# Patient Record
Sex: MALE | Race: BLACK OR AFRICAN AMERICAN | Employment: OTHER | URBAN - METROPOLITAN AREA
[De-identification: names, ages, dates, MRNs, and addresses within clinical notes are randomized per-mention and may not be internally consistent; named-entity substitution may affect disease eponyms.]

---

## 2018-01-21 ENCOUNTER — HOSPITAL ENCOUNTER (OUTPATIENT)
Dept: OTHER | Age: 58
Discharge: HOME | End: 2018-01-21
Attending: INTERNAL MEDICINE | Admitting: INTERNAL MEDICINE

## 2018-01-21 DIAGNOSIS — R52 PAIN: ICD-10-CM

## 2018-11-01 ASSESSMENT — PAIN SCALES - GENERAL: PAINLEVEL_OUTOF10: 10

## 2018-11-01 ASSESSMENT — PAIN DESCRIPTION - LOCATION: LOCATION: BACK;NECK

## 2018-11-01 ASSESSMENT — PAIN DESCRIPTION - PAIN TYPE: TYPE: CHRONIC PAIN

## 2018-11-02 ENCOUNTER — HOSPITAL ENCOUNTER (EMERGENCY)
Age: 58
Discharge: HOME | End: 2018-11-02
Attending: EMERGENCY MEDICINE
Payer: COMMERCIAL

## 2018-11-02 VITALS
RESPIRATION RATE: 18 BRPM | WEIGHT: 289.9 LBS | HEART RATE: 74 BPM | OXYGEN SATURATION: 99 % | BODY MASS INDEX: 39.27 KG/M2 | SYSTOLIC BLOOD PRESSURE: 148 MMHG | DIASTOLIC BLOOD PRESSURE: 84 MMHG | HEIGHT: 72 IN | TEMPERATURE: 97.1 F

## 2018-11-02 DIAGNOSIS — M62.838 SPASM OF MUSCLE: Primary | ICD-10-CM

## 2018-11-02 DIAGNOSIS — S39.012A BACK STRAIN, INITIAL ENCOUNTER: ICD-10-CM

## 2018-11-02 PROCEDURE — 99283 EMERGENCY DEPT VISIT LOW MDM: CPT

## 2018-11-02 PROCEDURE — 6370000000 HC RX 637 (ALT 250 FOR IP): Performed by: EMERGENCY MEDICINE

## 2018-11-02 PROCEDURE — 6360000002 HC RX W HCPCS: Performed by: EMERGENCY MEDICINE

## 2018-11-02 PROCEDURE — 96372 THER/PROPH/DIAG INJ SC/IM: CPT

## 2018-11-02 RX ORDER — METHYLPREDNISOLONE 4 MG/1
TABLET ORAL
Qty: 1 KIT | Refills: 0 | Status: SHIPPED | OUTPATIENT
Start: 2018-11-02 | End: 2019-01-15

## 2018-11-02 RX ORDER — OXYCODONE HYDROCHLORIDE AND ACETAMINOPHEN 5; 325 MG/1; MG/1
1 TABLET ORAL EVERY 6 HOURS PRN
Qty: 6 TABLET | Refills: 0 | Status: SHIPPED | OUTPATIENT
Start: 2018-11-02 | End: 2018-11-09

## 2018-11-02 RX ORDER — KETOROLAC TROMETHAMINE 30 MG/ML
30 INJECTION, SOLUTION INTRAMUSCULAR; INTRAVENOUS ONCE
Status: COMPLETED | OUTPATIENT
Start: 2018-11-02 | End: 2018-11-02

## 2018-11-02 RX ORDER — CYCLOBENZAPRINE HCL 10 MG
10 TABLET ORAL 3 TIMES DAILY PRN
Qty: 12 TABLET | Refills: 0 | Status: SHIPPED | OUTPATIENT
Start: 2018-11-02 | End: 2018-11-12

## 2018-11-02 RX ORDER — ORPHENADRINE CITRATE 30 MG/ML
60 INJECTION INTRAMUSCULAR; INTRAVENOUS ONCE
Status: COMPLETED | OUTPATIENT
Start: 2018-11-02 | End: 2018-11-02

## 2018-11-02 RX ORDER — OXYCODONE HYDROCHLORIDE AND ACETAMINOPHEN 5; 325 MG/1; MG/1
1 TABLET ORAL ONCE
Status: COMPLETED | OUTPATIENT
Start: 2018-11-02 | End: 2018-11-02

## 2018-11-02 RX ADMIN — ORPHENADRINE CITRATE 60 MG: 30 INJECTION INTRAMUSCULAR; INTRAVENOUS at 01:53

## 2018-11-02 RX ADMIN — OXYCODONE HYDROCHLORIDE AND ACETAMINOPHEN 1 TABLET: 5; 325 TABLET ORAL at 03:10

## 2018-11-02 RX ADMIN — KETOROLAC TROMETHAMINE 30 MG: 30 INJECTION, SOLUTION INTRAMUSCULAR at 01:54

## 2018-11-02 ASSESSMENT — PAIN - FUNCTIONAL ASSESSMENT: PAIN_FUNCTIONAL_ASSESSMENT: 0-10

## 2018-11-02 ASSESSMENT — PAIN DESCRIPTION - LOCATION: LOCATION: BACK

## 2018-11-02 ASSESSMENT — PAIN DESCRIPTION - PAIN TYPE: TYPE: ACUTE PAIN;CHRONIC PAIN

## 2018-11-02 ASSESSMENT — PAIN SCALES - GENERAL
PAINLEVEL_OUTOF10: 8

## 2019-01-18 ENCOUNTER — ANESTHESIA EVENT (OUTPATIENT)
Dept: MRI IMAGING | Age: 59
End: 2019-01-18

## 2019-01-18 ENCOUNTER — HOSPITAL ENCOUNTER (OUTPATIENT)
Dept: MRI IMAGING | Age: 59
Discharge: HOME | End: 2019-01-18
Payer: COMMERCIAL

## 2019-01-18 ENCOUNTER — ANESTHESIA (OUTPATIENT)
Dept: MRI IMAGING | Age: 59
End: 2019-01-18

## 2019-01-18 VITALS
WEIGHT: 287 LBS | HEART RATE: 62 BPM | SYSTOLIC BLOOD PRESSURE: 145 MMHG | OXYGEN SATURATION: 94 % | BODY MASS INDEX: 38.87 KG/M2 | RESPIRATION RATE: 16 BRPM | TEMPERATURE: 98 F | DIASTOLIC BLOOD PRESSURE: 86 MMHG | HEIGHT: 72 IN

## 2019-01-18 DIAGNOSIS — M25.512 LEFT SHOULDER PAIN, UNSPECIFIED CHRONICITY: ICD-10-CM

## 2019-01-18 DIAGNOSIS — M54.2 CERVICALGIA: ICD-10-CM

## 2019-01-18 LAB
GLUCOSE BLD-MCNC: 157 MG/DL (ref 70–99)
PERFORMED ON: ABNORMAL

## 2019-01-18 PROCEDURE — 7100000000 HC PACU RECOVERY - FIRST 15 MIN

## 2019-01-18 PROCEDURE — 2500000003 HC RX 250 WO HCPCS

## 2019-01-18 PROCEDURE — 7100000011 HC PHASE II RECOVERY - ADDTL 15 MIN

## 2019-01-18 PROCEDURE — 7100000001 HC PACU RECOVERY - ADDTL 15 MIN

## 2019-01-18 PROCEDURE — 6370000000 HC RX 637 (ALT 250 FOR IP): Performed by: ANESTHESIOLOGY

## 2019-01-18 PROCEDURE — 3700000001 HC ADD 15 MINUTES (ANESTHESIA)

## 2019-01-18 PROCEDURE — 6360000002 HC RX W HCPCS

## 2019-01-18 PROCEDURE — 7100000010 HC PHASE II RECOVERY - FIRST 15 MIN

## 2019-01-18 PROCEDURE — 72141 MRI NECK SPINE W/O DYE: CPT

## 2019-01-18 PROCEDURE — 3700000000 HC ANESTHESIA ATTENDED CARE

## 2019-01-18 PROCEDURE — 73221 MRI JOINT UPR EXTREM W/O DYE: CPT

## 2019-01-18 RX ORDER — FENTANYL CITRATE 50 UG/ML
25 INJECTION, SOLUTION INTRAMUSCULAR; INTRAVENOUS EVERY 5 MIN PRN
Status: DISCONTINUED | OUTPATIENT
Start: 2019-01-18 | End: 2019-01-19 | Stop reason: HOSPADM

## 2019-01-18 RX ORDER — ONDANSETRON 2 MG/ML
4 INJECTION INTRAMUSCULAR; INTRAVENOUS
Status: ACTIVE | OUTPATIENT
Start: 2019-01-18 | End: 2019-01-18

## 2019-01-18 RX ORDER — HYDRALAZINE HYDROCHLORIDE 20 MG/ML
5 INJECTION INTRAMUSCULAR; INTRAVENOUS EVERY 10 MIN PRN
Status: DISCONTINUED | OUTPATIENT
Start: 2019-01-18 | End: 2019-01-19 | Stop reason: HOSPADM

## 2019-01-18 RX ORDER — SULFAMETHOXAZOLE AND TRIMETHOPRIM 800; 160 MG/1; MG/1
1 TABLET ORAL 2 TIMES DAILY
COMMUNITY
End: 2021-07-13 | Stop reason: ALTCHOICE

## 2019-01-18 RX ORDER — FENTANYL CITRATE 50 UG/ML
50 INJECTION, SOLUTION INTRAMUSCULAR; INTRAVENOUS EVERY 5 MIN PRN
Status: DISCONTINUED | OUTPATIENT
Start: 2019-01-18 | End: 2019-01-19 | Stop reason: HOSPADM

## 2019-01-18 RX ORDER — LABETALOL HYDROCHLORIDE 5 MG/ML
5 INJECTION, SOLUTION INTRAVENOUS EVERY 10 MIN PRN
Status: DISCONTINUED | OUTPATIENT
Start: 2019-01-18 | End: 2019-01-19 | Stop reason: HOSPADM

## 2019-01-18 RX ORDER — GLIPIZIDE 5 MG/1
5 TABLET ORAL DAILY
COMMUNITY

## 2019-01-18 RX ORDER — PROMETHAZINE HYDROCHLORIDE 25 MG/ML
6.25 INJECTION, SOLUTION INTRAMUSCULAR; INTRAVENOUS
Status: ACTIVE | OUTPATIENT
Start: 2019-01-18 | End: 2019-01-18

## 2019-01-18 RX ORDER — ATORVASTATIN CALCIUM 10 MG/1
10 TABLET, FILM COATED ORAL DAILY
COMMUNITY

## 2019-01-18 RX ORDER — OXYCODONE HYDROCHLORIDE AND ACETAMINOPHEN 5; 325 MG/1; MG/1
1 TABLET ORAL EVERY 4 HOURS PRN
Status: DISCONTINUED | OUTPATIENT
Start: 2019-01-18 | End: 2019-01-19 | Stop reason: HOSPADM

## 2019-01-18 RX ORDER — OXYCODONE HYDROCHLORIDE AND ACETAMINOPHEN 5; 325 MG/1; MG/1
1 TABLET ORAL
Status: DISPENSED | OUTPATIENT
Start: 2019-01-18 | End: 2019-01-18

## 2019-01-18 RX ADMIN — OXYCODONE HYDROCHLORIDE AND ACETAMINOPHEN 1 TABLET: 5; 325 TABLET ORAL at 16:37

## 2019-01-18 ASSESSMENT — PAIN - FUNCTIONAL ASSESSMENT
PAIN_FUNCTIONAL_ASSESSMENT: 0-10
PAIN_FUNCTIONAL_ASSESSMENT: 0-10

## 2019-01-18 ASSESSMENT — PAIN DESCRIPTION - DESCRIPTORS: DESCRIPTORS: ACHING;DISCOMFORT

## 2019-01-18 ASSESSMENT — PAIN DESCRIPTION - LOCATION: LOCATION: NECK

## 2019-01-18 ASSESSMENT — PAIN SCALES - GENERAL
PAINLEVEL_OUTOF10: 6
PAINLEVEL_OUTOF10: 0

## 2019-01-18 ASSESSMENT — PAIN DESCRIPTION - PAIN TYPE: TYPE: CHRONIC PAIN

## 2019-01-23 ENCOUNTER — HOSPITAL ENCOUNTER (OUTPATIENT)
Dept: GENERAL RADIOLOGY | Age: 59
Discharge: HOME | End: 2019-01-23
Payer: COMMERCIAL

## 2019-01-23 ENCOUNTER — HOSPITAL ENCOUNTER (OUTPATIENT)
Age: 59
Discharge: HOME | End: 2019-01-23
Payer: COMMERCIAL

## 2019-01-23 DIAGNOSIS — M47.816 SPONDYLOSIS WITHOUT MYELOPATHY OR RADICULOPATHY, LUMBAR REGION: ICD-10-CM

## 2019-01-23 PROCEDURE — 72110 X-RAY EXAM L-2 SPINE 4/>VWS: CPT

## 2019-04-07 ENCOUNTER — HOSPITAL ENCOUNTER (EMERGENCY)
Age: 59
Discharge: HOME | End: 2019-04-08
Attending: EMERGENCY MEDICINE
Payer: COMMERCIAL

## 2019-04-07 VITALS
DIASTOLIC BLOOD PRESSURE: 73 MMHG | RESPIRATION RATE: 18 BRPM | HEART RATE: 86 BPM | WEIGHT: 288.14 LBS | OXYGEN SATURATION: 97 % | SYSTOLIC BLOOD PRESSURE: 160 MMHG | BODY MASS INDEX: 39.08 KG/M2 | TEMPERATURE: 98.6 F

## 2019-04-07 DIAGNOSIS — J06.9 VIRAL URI WITH COUGH: Primary | ICD-10-CM

## 2019-04-07 PROCEDURE — 87804 INFLUENZA ASSAY W/OPTIC: CPT

## 2019-04-07 PROCEDURE — 99283 EMERGENCY DEPT VISIT LOW MDM: CPT

## 2019-04-07 ASSESSMENT — PAIN DESCRIPTION - FREQUENCY: FREQUENCY: CONTINUOUS

## 2019-04-07 ASSESSMENT — PAIN SCALES - GENERAL: PAINLEVEL_OUTOF10: 6

## 2019-04-07 ASSESSMENT — PAIN DESCRIPTION - PROGRESSION: CLINICAL_PROGRESSION: GRADUALLY WORSENING

## 2019-04-07 ASSESSMENT — PAIN - FUNCTIONAL ASSESSMENT: PAIN_FUNCTIONAL_ASSESSMENT: ACTIVITIES ARE NOT PREVENTED

## 2019-04-07 ASSESSMENT — PAIN DESCRIPTION - ONSET: ONSET: PROGRESSIVE

## 2019-04-07 ASSESSMENT — PAIN DESCRIPTION - PAIN TYPE: TYPE: ACUTE PAIN

## 2019-04-07 ASSESSMENT — PAIN DESCRIPTION - DESCRIPTORS: DESCRIPTORS: ACHING

## 2019-04-08 LAB
RAPID INFLUENZA  B AGN: NEGATIVE
RAPID INFLUENZA A AGN: NEGATIVE

## 2019-04-08 ASSESSMENT — PAIN SCALES - GENERAL
PAINLEVEL_OUTOF10: 6
PAINLEVEL_OUTOF10: 6

## 2019-04-08 ASSESSMENT — PAIN - FUNCTIONAL ASSESSMENT
PAIN_FUNCTIONAL_ASSESSMENT: 0-10
PAIN_FUNCTIONAL_ASSESSMENT: ACTIVITIES ARE NOT PREVENTED
PAIN_FUNCTIONAL_ASSESSMENT: ACTIVITIES ARE NOT PREVENTED

## 2019-04-08 ASSESSMENT — PAIN DESCRIPTION - PROGRESSION
CLINICAL_PROGRESSION: NOT CHANGED
CLINICAL_PROGRESSION: GRADUALLY WORSENING

## 2019-04-08 ASSESSMENT — PAIN DESCRIPTION - DESCRIPTORS
DESCRIPTORS: ACHING
DESCRIPTORS: ACHING

## 2019-04-08 ASSESSMENT — PAIN DESCRIPTION - ONSET
ONSET: ON-GOING
ONSET: PROGRESSIVE

## 2019-04-08 ASSESSMENT — PAIN DESCRIPTION - FREQUENCY
FREQUENCY: CONTINUOUS
FREQUENCY: CONTINUOUS

## 2019-04-08 ASSESSMENT — PAIN DESCRIPTION - LOCATION: LOCATION: OTHER (COMMENT)

## 2019-04-08 ASSESSMENT — PAIN DESCRIPTION - ORIENTATION: ORIENTATION: MID

## 2019-04-08 ASSESSMENT — PAIN DESCRIPTION - PAIN TYPE
TYPE: ACUTE PAIN
TYPE: ACUTE PAIN

## 2019-04-08 NOTE — ED PROVIDER NOTES
eMERGENCY dEPARTMENT eNCOUnter      [facility]    Chief Complaint   Patient presents with    Cough     hoarseness, sore throat, general body aches. started tonight       HPI    Aleksandar Chua is a 60 y.o. male who presents with hoarseness, sore throat, and general body aches that started today. He has had these symptoms for less than 24 hours and is now in the emergency department at night for evaluation and treatment. He does not have anything wrong with his immune system nor has he had any travel outside the [place] or sick contacts. He's been afebrile and he has been taking over-the-counter Tylenol or Motrin. There's been no vomiting or diarrhea and no exacerbating or relieving factors.      PAST MEDICAL HISTORY    Past Medical History:   Diagnosis Date    Asthma     Chronic pain     DDD (degenerative disc disease), lumbar     Diabetes mellitus ([facility]     GERD (gastroesophageal reflux disease)     Hyperlipidemia     Hypertension     Morbid obesity with BMI of 40.0-44.9, [facility] 5/20/2015    Osteoarthritis     Vitamin D deficiency        SURGICAL HISTORY    Past Surgical History:   Procedure Laterality Date    FOOT SURGERY Left 4-    SHAKA / AKIN BUNIONECTOMY LEFT FOOT, EXCISE NEUROMA LEFT    FOOT SURGERY Left     JOINT REPLACEMENT Right     knee    KNEE ARTHROSCOPY      X 6    TOTAL KNEE ARTHROPLASTY Right 1/6/15       CURRENT MEDICATIONS    Current Outpatient Rx   Medication Sig Dispense Refill    glipiZIDE (GLUCOTROL) 5 MG tablet Take 5 mg by mouth daily      atorvastatin (LIPITOR) 10 MG tablet Take 10 mg by mouth daily      sulfamethoxazole-trimethoprim (BACTRIM DS;SEPTRA DS) 800-160 MG per tablet Take 1 tablet by mouth 2 times daily      Insulin Glargine (TOUJEO SOLOSTAR SC) Inject 66 Int'l Units into the skin nightly       ibuprofen (ADVIL;MOTRIN) 400 MG tablet Take 1 tablet by mouth every 6 hours as needed for Pain 20 tablet 0    meloxicam (MOBIC) 15 MG tablet Take 1 tablet by mouth daily 30 tablet 2    gabapentin (NEURONTIN) 800 MG tablet Take 1 tablet by mouth 2 times daily 60 tablet 2    albuterol (VENTOLIN HFA) 108 (90 BASE) MCG/ACT inhaler Inhale 2 puffs into the lungs every 6 hours as needed for Wheezing 1 Inhaler 2    hydrochlorothiazide (HYDRODIURIL) 25 MG tablet Take 1 qd 30 tablet 2    omeprazole (PRILOSEC) 20 MG capsule Take 1 capsule by mouth daily 30 capsule 2    aspirin 81 MG EC tablet Take 1 tablet by mouth daily.  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   Social Needs    Financial resource strain: None    Food insecurity:     Worry: None     Inability: None    Transportation needs:     Medical: None     Non-medical: None   Tobacco Use    Smoking status: Never Smoker    Smokeless tobacco: Never Used   Substance and Sexual Activity    Alcohol use: No    Drug use: No    Sexual activity: Not Currently     Partners: Female   Lifestyle    Physical activity:     Days per week: None     Minutes per session: None    Stress: None   Relationships    Social connections:     Talks on phone: None     Gets together: None     Attends Worship service: None     Active member of club or organization: None     Attends meetings of clubs or organizations: None     Relationship status: None    Intimate partner violence:     Fear of current or ex partner: None     Emotionally abused: None     Physically abused: None     Forced sexual activity: None   Other Topics Concern    None   Social History Narrative    None REVIEW OF SYSTEMS    Constitutional:  Achiness  Eyes:  Denies photophobia or discharge   HENT: Sore throat  Respiratory:  Denies cough or shortness of breath   Cardiovascular:  Denies chest pain, palpitations or swelling   GI:  Denies abdominal pain, nausea, vomiting, or diarrhea   Musculoskeletal:  Denies back pain   Skin:  Denies rash   Neurologic:  Denies headache, focal weakness or sensory changes   Endocrine:  Denies polyuria or polydypsia   Lymphatic:  Denies swollen glands   Psychiatric:  Denies depression, suicidal ideation or homicidal ideation   All systems negative except as marked. PHYSICAL EXAM    VITAL SIGNS: BP (!) 160/73   Pulse 86   Temp 98.6 °F (37 °C) (Oral)   Resp 18   Wt 288 lb 2.3 oz (130.7 kg)   SpO2 97%   BMI 39.08 kg/m²    Constitutional:  Well developed, Well nourished, No acute distress, Non-toxic appearance. HENT:  Normocephalic, Atraumatic, Bilateral external ears normal, Oropharynx moist, No oral exudates, Nose normal. Neck- Normal range of motion, No tenderness, Supple, No stridor. Eyes:  PERRL, EOMI, Conjunctiva normal, No discharge. Respiratory:  Normal breath sounds, No respiratory distress, No wheezing, No chest tenderness. Cardiovascular:  Normal heart rate, Normal rhythm, No murmurs, No rubs, No gallops. GI:  Bowel sounds normal, Soft, No tenderness, No masses, No pulsatile masses. Musculoskeletal:  Intact distal pulses, No edema, No tenderness, No cyanosis, No clubbing. Good range of motion in all major joints. No tenderness to palpation or major deformities noted. Back- No tenderness. Integument:  Warm, Dry, No erythema, No rash. Lymphatic:  No lymphadenopathy noted. Neurologic:  Alert & oriented x 3, Normal motor function, Normal sensory function, No focal deficits noted.    Psychiatric:  Affect normal, Judgment normal, Mood normal.     EKG        RADIOLOGY        PROCEDURES    Labs Reviewed   RAPID INFLUENZA A/B ANTIGENS    Narrative:

## 2019-12-26 ENCOUNTER — APPOINTMENT (OUTPATIENT)
Dept: CT IMAGING | Age: 59
End: 2019-12-26
Payer: COMMERCIAL

## 2019-12-26 ENCOUNTER — HOSPITAL ENCOUNTER (EMERGENCY)
Age: 59
Discharge: HOME | End: 2019-12-27
Payer: COMMERCIAL

## 2019-12-26 DIAGNOSIS — M54.50 ACUTE LEFT-SIDED LOW BACK PAIN WITHOUT SCIATICA: Primary | ICD-10-CM

## 2019-12-26 LAB
A/G RATIO: 0.9 (ref 1.1–2.2)
ALBUMIN SERPL-MCNC: 3.6 G/DL (ref 3.4–5)
ALP BLD-CCNC: 90 U/L (ref 40–129)
ALT SERPL-CCNC: 17 U/L (ref 10–40)
ANION GAP SERPL CALCULATED.3IONS-SCNC: 16 MMOL/L (ref 3–16)
AST SERPL-CCNC: 16 U/L (ref 15–37)
BASOPHILS ABSOLUTE: 0 K/UL (ref 0–0.2)
BASOPHILS RELATIVE PERCENT: 0.4 %
BILIRUB SERPL-MCNC: 0.3 MG/DL (ref 0–1)
BILIRUBIN URINE: NEGATIVE
BLOOD, URINE: NEGATIVE
BUN BLDV-MCNC: 13 MG/DL (ref 7–20)
CALCIUM SERPL-MCNC: 9.3 MG/DL (ref 8.3–10.6)
CHLORIDE BLD-SCNC: 98 MMOL/L (ref 99–110)
CLARITY: CLEAR
CO2: 22 MMOL/L (ref 21–32)
COLOR: YELLOW
CREAT SERPL-MCNC: 1 MG/DL (ref 0.8–1.3)
EOSINOPHILS ABSOLUTE: 0.4 K/UL (ref 0–0.6)
EOSINOPHILS RELATIVE PERCENT: 3.7 %
GFR AFRICAN AMERICAN: >60
GFR NON-AFRICAN AMERICAN: >60
GLOBULIN: 4 G/DL
GLUCOSE BLD-MCNC: 206 MG/DL (ref 70–99)
GLUCOSE URINE: 250 MG/DL
HCT VFR BLD CALC: 46.4 % (ref 40.5–52.5)
HEMOGLOBIN: 15.2 G/DL (ref 13.5–17.5)
KETONES, URINE: NEGATIVE MG/DL
LEUKOCYTE ESTERASE, URINE: NEGATIVE
LIPASE: 7 U/L (ref 13–60)
LYMPHOCYTES ABSOLUTE: 2.5 K/UL (ref 1–5.1)
LYMPHOCYTES RELATIVE PERCENT: 22.2 %
MCH RBC QN AUTO: 26.9 PG (ref 26–34)
MCHC RBC AUTO-ENTMCNC: 32.7 G/DL (ref 31–36)
MCV RBC AUTO: 82.3 FL (ref 80–100)
MICROSCOPIC EXAMINATION: ABNORMAL
MONOCYTES ABSOLUTE: 1 K/UL (ref 0–1.3)
MONOCYTES RELATIVE PERCENT: 9.2 %
NEUTROPHILS ABSOLUTE: 7.3 K/UL (ref 1.7–7.7)
NEUTROPHILS RELATIVE PERCENT: 64.5 %
NITRITE, URINE: NEGATIVE
PDW BLD-RTO: 14.8 % (ref 12.4–15.4)
PH UA: 5 (ref 5–8)
PLATELET # BLD: 210 K/UL (ref 135–450)
PMV BLD AUTO: 9.4 FL (ref 5–10.5)
POTASSIUM REFLEX MAGNESIUM: 3.9 MMOL/L (ref 3.5–5.1)
PROTEIN UA: NEGATIVE MG/DL
RBC # BLD: 5.64 M/UL (ref 4.2–5.9)
SODIUM BLD-SCNC: 136 MMOL/L (ref 136–145)
SPECIFIC GRAVITY UA: 1.02 (ref 1–1.03)
TOTAL PROTEIN: 7.6 G/DL (ref 6.4–8.2)
URINE REFLEX TO CULTURE: ABNORMAL
URINE TYPE: ABNORMAL
UROBILINOGEN, URINE: 1 E.U./DL
WBC # BLD: 11.2 K/UL (ref 4–11)

## 2019-12-26 PROCEDURE — 81003 URINALYSIS AUTO W/O SCOPE: CPT

## 2019-12-26 PROCEDURE — 99283 EMERGENCY DEPT VISIT LOW MDM: CPT

## 2019-12-26 PROCEDURE — 83690 ASSAY OF LIPASE: CPT

## 2019-12-26 PROCEDURE — 85025 COMPLETE CBC W/AUTO DIFF WBC: CPT

## 2019-12-26 PROCEDURE — 80053 COMPREHEN METABOLIC PANEL: CPT

## 2019-12-26 RX ORDER — CYCLOBENZAPRINE HCL 10 MG
10 TABLET ORAL 3 TIMES DAILY PRN
Qty: 21 TABLET | Refills: 0 | Status: SHIPPED | OUTPATIENT
Start: 2019-12-26 | End: 2020-01-02

## 2019-12-26 RX ORDER — IBUPROFEN 800 MG/1
800 TABLET ORAL EVERY 8 HOURS PRN
Qty: 30 TABLET | Refills: 0 | Status: SHIPPED | OUTPATIENT
Start: 2019-12-26

## 2019-12-26 ASSESSMENT — PAIN DESCRIPTION - FREQUENCY: FREQUENCY: CONTINUOUS

## 2019-12-26 ASSESSMENT — PAIN SCALES - GENERAL
PAINLEVEL_OUTOF10: 10
PAINLEVEL_OUTOF10: 8

## 2019-12-26 ASSESSMENT — PAIN DESCRIPTION - PAIN TYPE
TYPE: ACUTE PAIN
TYPE: ACUTE PAIN

## 2019-12-26 ASSESSMENT — PAIN - FUNCTIONAL ASSESSMENT
PAIN_FUNCTIONAL_ASSESSMENT: PREVENTS OR INTERFERES SOME ACTIVE ACTIVITIES AND ADLS
PAIN_FUNCTIONAL_ASSESSMENT: PREVENTS OR INTERFERES SOME ACTIVE ACTIVITIES AND ADLS

## 2019-12-26 ASSESSMENT — PAIN DESCRIPTION - ORIENTATION: ORIENTATION: LEFT

## 2019-12-26 ASSESSMENT — PAIN DESCRIPTION - ONSET: ONSET: ON-GOING

## 2019-12-26 ASSESSMENT — PAIN DESCRIPTION - PROGRESSION
CLINICAL_PROGRESSION: GRADUALLY IMPROVING
CLINICAL_PROGRESSION: NOT CHANGED

## 2019-12-26 ASSESSMENT — PAIN DESCRIPTION - DESCRIPTORS: DESCRIPTORS: SHARP

## 2019-12-26 ASSESSMENT — PAIN DESCRIPTION - LOCATION: LOCATION: FLANK

## 2019-12-27 VITALS
TEMPERATURE: 97.8 F | WEIGHT: 291.01 LBS | RESPIRATION RATE: 17 BRPM | DIASTOLIC BLOOD PRESSURE: 61 MMHG | OXYGEN SATURATION: 98 % | HEIGHT: 72 IN | HEART RATE: 62 BPM | BODY MASS INDEX: 39.42 KG/M2 | SYSTOLIC BLOOD PRESSURE: 145 MMHG

## 2020-09-17 ENCOUNTER — APPOINTMENT (OUTPATIENT)
Dept: GENERAL RADIOLOGY | Age: 60
End: 2020-09-17
Payer: COMMERCIAL

## 2020-09-17 ENCOUNTER — HOSPITAL ENCOUNTER (EMERGENCY)
Age: 60
Discharge: HOME | End: 2020-09-17
Payer: COMMERCIAL

## 2020-09-17 VITALS
TEMPERATURE: 98.4 F | HEIGHT: 73 IN | RESPIRATION RATE: 16 BRPM | DIASTOLIC BLOOD PRESSURE: 61 MMHG | SYSTOLIC BLOOD PRESSURE: 158 MMHG | WEIGHT: 293.87 LBS | BODY MASS INDEX: 38.95 KG/M2 | OXYGEN SATURATION: 98 % | HEART RATE: 64 BPM

## 2020-09-17 LAB
ANION GAP SERPL CALCULATED.3IONS-SCNC: 9 MMOL/L (ref 3–16)
BASOPHILS ABSOLUTE: 0.1 K/UL (ref 0–0.2)
BASOPHILS RELATIVE PERCENT: 0.9 %
BILIRUBIN URINE: NEGATIVE
BLOOD, URINE: NEGATIVE
BUN BLDV-MCNC: 12 MG/DL (ref 7–20)
CALCIUM SERPL-MCNC: 8.8 MG/DL (ref 8.3–10.6)
CHLORIDE BLD-SCNC: 98 MMOL/L (ref 99–110)
CLARITY: CLEAR
CO2: 26 MMOL/L (ref 21–32)
COLOR: YELLOW
CREAT SERPL-MCNC: 1 MG/DL (ref 0.8–1.3)
D DIMER: 272 NG/ML DDU (ref 0–229)
EOSINOPHILS ABSOLUTE: 0.3 K/UL (ref 0–0.6)
EOSINOPHILS RELATIVE PERCENT: 2.6 %
GFR AFRICAN AMERICAN: >60
GFR NON-AFRICAN AMERICAN: >60
GLUCOSE BLD-MCNC: 302 MG/DL (ref 70–99)
GLUCOSE URINE: >=1000 MG/DL
HCT VFR BLD CALC: 45.7 % (ref 40.5–52.5)
HEMOGLOBIN: 15.3 G/DL (ref 13.5–17.5)
KETONES, URINE: NEGATIVE MG/DL
LEUKOCYTE ESTERASE, URINE: NEGATIVE
LYMPHOCYTES ABSOLUTE: 2.1 K/UL (ref 1–5.1)
LYMPHOCYTES RELATIVE PERCENT: 19.5 %
MCH RBC QN AUTO: 27.3 PG (ref 26–34)
MCHC RBC AUTO-ENTMCNC: 33.4 G/DL (ref 31–36)
MCV RBC AUTO: 81.6 FL (ref 80–100)
MICROSCOPIC EXAMINATION: ABNORMAL
MONOCYTES ABSOLUTE: 0.6 K/UL (ref 0–1.3)
MONOCYTES RELATIVE PERCENT: 5.7 %
NEUTROPHILS ABSOLUTE: 7.8 K/UL (ref 1.7–7.7)
NEUTROPHILS RELATIVE PERCENT: 71.3 %
NITRITE, URINE: NEGATIVE
PDW BLD-RTO: 14.8 % (ref 12.4–15.4)
PH UA: 5.5 (ref 5–8)
PLATELET # BLD: 153 K/UL (ref 135–450)
PLATELET SLIDE REVIEW: ADEQUATE
PMV BLD AUTO: 9.2 FL (ref 5–10.5)
POTASSIUM SERPL-SCNC: 3.8 MMOL/L (ref 3.5–5.1)
PROTEIN UA: NEGATIVE MG/DL
RBC # BLD: 5.6 M/UL (ref 4.2–5.9)
REASON FOR REJECTION: NORMAL
REJECTED TEST: NORMAL
SLIDE REVIEW: ABNORMAL
SODIUM BLD-SCNC: 133 MMOL/L (ref 136–145)
SPECIFIC GRAVITY UA: 1.02 (ref 1–1.03)
URINE REFLEX TO CULTURE: ABNORMAL
URINE TYPE: ABNORMAL
UROBILINOGEN, URINE: 1 E.U./DL
WBC # BLD: 10.9 K/UL (ref 4–11)

## 2020-09-17 PROCEDURE — 73030 X-RAY EXAM OF SHOULDER: CPT

## 2020-09-17 PROCEDURE — 6360000002 HC RX W HCPCS: Performed by: PHYSICIAN ASSISTANT

## 2020-09-17 PROCEDURE — 85379 FIBRIN DEGRADATION QUANT: CPT

## 2020-09-17 PROCEDURE — 71046 X-RAY EXAM CHEST 2 VIEWS: CPT

## 2020-09-17 PROCEDURE — 99283 EMERGENCY DEPT VISIT LOW MDM: CPT

## 2020-09-17 PROCEDURE — 81003 URINALYSIS AUTO W/O SCOPE: CPT

## 2020-09-17 PROCEDURE — 96374 THER/PROPH/DIAG INJ IV PUSH: CPT

## 2020-09-17 PROCEDURE — 93005 ELECTROCARDIOGRAM TRACING: CPT | Performed by: PHYSICIAN ASSISTANT

## 2020-09-17 PROCEDURE — 80048 BASIC METABOLIC PNL TOTAL CA: CPT

## 2020-09-17 PROCEDURE — 85025 COMPLETE CBC W/AUTO DIFF WBC: CPT

## 2020-09-17 RX ORDER — KETOROLAC TROMETHAMINE 30 MG/ML
30 INJECTION, SOLUTION INTRAMUSCULAR; INTRAVENOUS ONCE
Status: COMPLETED | OUTPATIENT
Start: 2020-09-17 | End: 2020-09-17

## 2020-09-17 RX ADMIN — KETOROLAC TROMETHAMINE 30 MG: 30 INJECTION, SOLUTION INTRAMUSCULAR at 17:21

## 2020-09-17 ASSESSMENT — PAIN - FUNCTIONAL ASSESSMENT: PAIN_FUNCTIONAL_ASSESSMENT: PREVENTS OR INTERFERES SOME ACTIVE ACTIVITIES AND ADLS

## 2020-09-17 ASSESSMENT — ENCOUNTER SYMPTOMS
SHORTNESS OF BREATH: 0
BACK PAIN: 1
VOMITING: 0
NAUSEA: 0
SORE THROAT: 0
ABDOMINAL PAIN: 0

## 2020-09-17 ASSESSMENT — PAIN DESCRIPTION - DESCRIPTORS: DESCRIPTORS: OTHER (COMMENT)

## 2020-09-17 ASSESSMENT — PAIN DESCRIPTION - ONSET: ONSET: ON-GOING

## 2020-09-17 ASSESSMENT — PAIN DESCRIPTION - LOCATION: LOCATION: BACK

## 2020-09-17 ASSESSMENT — PAIN SCALES - GENERAL
PAINLEVEL_OUTOF10: 10
PAINLEVEL_OUTOF10: 10

## 2020-09-17 ASSESSMENT — PAIN DESCRIPTION - PAIN TYPE: TYPE: ACUTE PAIN

## 2020-09-17 ASSESSMENT — PAIN DESCRIPTION - ORIENTATION: ORIENTATION: UPPER

## 2020-09-17 ASSESSMENT — PAIN DESCRIPTION - FREQUENCY: FREQUENCY: CONTINUOUS

## 2020-09-17 ASSESSMENT — PAIN DESCRIPTION - PROGRESSION: CLINICAL_PROGRESSION: NOT CHANGED

## 2020-09-17 NOTE — ED PROVIDER NOTES
EKG Interpretation    Interpreted by emergency department physician  Time read: 2953    Rhythm: Sinus  Ventricular Rate: 68  QRS Axis: 5  Ectopy: Sinus arrhythmia  Conduction: Sinus rhythm with sinus arrhythmia  ST Segments: normal  T Waves: Diffusely flattened  Q Waves: None    Other findings: None    Compared to EKG on: None to compare    Clinical Impression: Normal sinus rhythm with sinus arrhythmia and diffusely flattened T waves but appears nonacute.     [address]  09/17/20 2542

## 2020-09-17 NOTE — ED TRIAGE NOTES
Pt presents to ED for upper back pain that started when getting up today. Denies injury or trauma. Hx of DDD. AAOx4, VSS, ambulatory to treatment room with steady gait.      Pain 10/10, denies taking any medications for pain

## 2020-09-18 LAB
EKG ATRIAL RATE: 68 BPM
EKG DIAGNOSIS: NORMAL
EKG P AXIS: 48 DEGREES
EKG P-R INTERVAL: 150 MS
EKG Q-T INTERVAL: 428 MS
EKG QRS DURATION: 86 MS
EKG QTC CALCULATION (BAZETT): 455 MS
EKG R AXIS: 5 DEGREES
EKG T AXIS: 44 DEGREES
EKG VENTRICULAR RATE: 68 BPM

## 2020-09-18 PROCEDURE — 93010 ELECTROCARDIOGRAM REPORT: CPT | Performed by: INTERNAL MEDICINE

## 2020-09-18 NOTE — ED PROVIDER NOTES
[address] [facility]      Pt Name: Tara Giraldo  MRN: [id number]  [place] 1960  Date of evaluation: 9/17/2020  Provider: Zeb Prajapati PA-C    This patient was not seen and evaluated by the attending physician No att. providers found. CHIEF COMPLAINT       Chief Complaint   Patient presents with    Back Pain     upper back, denies injury, states just old age, had a hard time getting up today         HISTORYOF PRESENT ILLNESS  (Location/Symptom, Timing/Onset, Context/Setting, Quality, Duration, Modifying Factors, Severity.)   Tara Giraldo is a 63 y.o. male who presents to the emergency department with right upper back pain and right shoulder pain. The patient states that the pain is been present for several days but it has been getting worse. It worsens with movement of his right shoulder if he reaches forward or if he takes a deep breath. He was seeing his pain management specialist for the pain and was told to go to the hospital for x-rays. He says he decided to come into the emergency department to have his back \"checked out again\". He denies feeling significantly short of breath and denies any chest pain or cough but does note that the pain is pleuritic in nature. He reports no weakness or numbness of extremities, loss of control of bowel or bladder, saddle anesthesia, any other concern. Nursing Notes were reviewedand I agree. REVIEW OF SYSTEMS    (2-9 systems for level 4, 10 or more forlevel 5)     Review of Systems   Constitutional: Negative for chills and fever. HENT: Negative for sore throat. Respiratory: Negative for shortness of breath. Cardiovascular: Negative for chest pain. Gastrointestinal: Negative for abdominal pain, nausea and vomiting. Genitourinary: Negative for difficulty urinating and dysuria. Musculoskeletal: Positive for arthralgias and back pain. Skin: Negative for rash.    Neurological: capsule by mouth daily, Disp-30 capsule, R-2      aspirin 81 MG EC tablet Take 1 tablet by mouth daily. , Disp-30 tablet, R-5             ALLERGIES     Patient has no known allergies. FAMILY HISTORY           Problem Relation Age of Onset    Heart Disease Father     Diabetes Mother     Heart Disease Mother      Family Status   Relation Name Status    Father      Mother  Alive        SOCIAL HISTORY    reports that he has never smoked. He has never used smokeless tobacco. He reports that he does not drink alcohol or use drugs. PHYSICAL EXAM    (up to 7 for level 4, 8 or more for level 5)     ED Triage Vitals [20 1627]   BP Temp Temp Source Pulse Resp SpO2 Height Weight   (!) 163/65 98.4 °F (36.9 °C) Oral 65 14 98 % 6' 1\" (1.854 m) 293 lb 14 oz (133.3 kg)       Physical Exam  Vitals signs and nursing note reviewed. Constitutional:       General: He is not in acute distress. Appearance: He is well-developed. HENT:      Head: Normocephalic and atraumatic. Neck:      Musculoskeletal: Neck supple. Cardiovascular:      Rate and Rhythm: Normal rate and regular rhythm. Heart sounds: Normal heart sounds. Pulmonary:      Effort: Pulmonary effort is normal. No respiratory distress. Breath sounds: Normal breath sounds. Abdominal:      General: Abdomen is flat. Tenderness: There is no abdominal tenderness. Musculoskeletal: Normal range of motion. Comments: Reduced range of motion of the right shoulder secondary to pain. He has reproducible tenderness to the right posterior rib region. There is no rash or skin changes. No crepitance. Skin:     General: Skin is warm and dry. Neurological:      Mental Status: He is alert and oriented to person, place, and time.    Psychiatric:         Behavior: Behavior normal.            DIAGNOSTIC RESULTS     EK-lead EKG interpreted as sinus rhythm with sinus arrhythmia with a rate of 68 bpm. No ST elevation or depression on my review. Please see Dr. Garth Dee note for full interpretation. RADIOLOGY:   Non-plain film images such as CT, Ultrasound and MRI are read by the radiologist.Plain radiographic images are visualized and preliminarily interpreted by Angeles Moralez PA-C with the below findings:        Interpretation per the Radiologist below, if available at the time of this note:    XR CHEST (2 VW)   Final Result   No evidence of acute cardiopulmonary disease. XR SHOULDER RIGHT (MIN 2 VIEWS)   Final Result   No acute fracture or dislocation. Severe osteoarthritis of the right shoulder.              LABS:  Labs Reviewed   CBC WITH AUTO DIFFERENTIAL - Abnormal; Notable for the following components:       Result Value    Neutrophils Absolute 7.8 (*)     All other components within normal limits    Narrative:     Susan Lacy tel. [phone],  Rejected Test Name/Called to: charge nurse Vanessa Bond, 09/17/2020 17:47, by [facility]  Performed at:  [address] [address]   Phone [phone]   D-DIMER, QUANTITATIVE - Abnormal; Notable for the following components:    D-Dimer, Quant 272 (*)     All other components within normal limits    Narrative:     Susan Lacy tel. [phone],  Rejected Test Name/Called to: charge nurse Vanessa Bond, 09/17/2020 17:47, by [facility]  Performed at:  [address] [address]   Phone [phone]   URINE RT REFLEX TO CULTURE - Abnormal; Notable for the following components:    Glucose, Ur >=1000 (*)     All other components within normal limits    Narrative:     Performed at:  [address] [address]   Phone [phone]   BASIC METABOLIC PANEL - Abnormal; Notable for the following components:    Sodium 133 (*)     Chloride 98 (*)     Glucose 302 (*)     All other components within normal limits Narrative:     Performed at:  [facility]  [address] [facility], [place] [address]   Phone [phone] REJECTION    Narrative:     Jacquelin Ernandez tel. [phone],  Rejected Test Name/Called to: charge nurse Jamil Anna, 09/17/2020 17:47, by [facility]  Performed at:  [facility]  [address] [facility], [place] [address]   Phone [phone]       All other labs were within normal range or not returned as of this dictation. EMERGENCY DEPARTMENT COURSE and DIFFERENTIAL DIAGNOSIS/MDM:   Vitals:    Vitals:    09/17/20 1627 09/17/20 1838   BP: (!) 163/65 (!) 158/61   Pulse: 65 64   Resp: 14 16   Temp: 98.4 °F (36.9 °C) 98.4 °F (36.9 °C)   TempSrc: Oral Oral   SpO2: 98% 98%   Weight: 293 lb 14 oz (133.3 kg)    Height: 6' 1\" (1.854 m)         I have evaluated this patient. My supervising physician was available for consultation. The patient presents with subacute pain but it is pleuritic in nature. His lungs are clear. X-ray showed no evidence of pneumothorax or pneumonia. An EKG was performed which showed no changes concerning for heart strain. His d-dimer is not elevated when adjusted for age. I have very low suspicion for pulmonary embolism in this patient. X-ray of the right shoulder did show significant arthritis. He was feeling much better after Toradol. He was advised to use ibuprofen twice daily with meals to help with pain control in addition to his prescribed pain medication from his pain specialist.  I have asked him to follow-up with orthopedics for further outpatient management. Discussed results, diagnosis and plan with patient and/or family. Questions addressed. Dispositionand follow-up agreed upon. Specific discharge instructions explained. The patient and/or family and I have discussed the diagnosis and risks, and we agree with discharging home to follow-up with their primary care,specialist or referral doctor. We also discussed returning to the Emergency Department immediately if new or worsening symptoms occur. We have discussed the symptoms which are most concerning that necessitate immediatereturn. PROCEDURES:  None    FINAL IMPRESSION      1. Arthritis of right shoulder region    2.  Upper back pain on right side          DISPOSITION/PLAN   DISPOSITION Decision To Discharge 09/17/2020 06:27:43 PM      PATIENT REFERRED TO:  Sarai Krause, [address] [facility]  [phone]    Schedule an appointment as soon as possible for a visit       [address] [facility] Emergency Department  [address] [place] [address]  [phone]    As needed, If symptoms worsen      MEDICATIONS:  Discharge Medication List as of 9/17/2020  6:32 PM          (Please note that portions of this note were completed with a voice recognition program.  Efforts were made toedit the dictations but occasionally words are mis-transcribed.)    FIDEL Hernandez PA-C  09/17/20 5426

## 2021-07-10 ENCOUNTER — HOSPITAL ENCOUNTER (EMERGENCY)
Age: 61
Discharge: HOME | End: 2021-07-10
Payer: COMMERCIAL

## 2021-07-10 ENCOUNTER — APPOINTMENT (OUTPATIENT)
Dept: GENERAL RADIOLOGY | Age: 61
End: 2021-07-10
Payer: COMMERCIAL

## 2021-07-10 VITALS
OXYGEN SATURATION: 95 % | WEIGHT: 294.09 LBS | HEART RATE: 65 BPM | TEMPERATURE: 97.7 F | DIASTOLIC BLOOD PRESSURE: 71 MMHG | HEIGHT: 72 IN | RESPIRATION RATE: 14 BRPM | SYSTOLIC BLOOD PRESSURE: 137 MMHG | BODY MASS INDEX: 39.83 KG/M2

## 2021-07-10 DIAGNOSIS — M17.12 OSTEOARTHRITIS OF LEFT KNEE, UNSPECIFIED OSTEOARTHRITIS TYPE: Primary | ICD-10-CM

## 2021-07-10 DIAGNOSIS — M25.562 ACUTE PAIN OF LEFT KNEE: ICD-10-CM

## 2021-07-10 PROCEDURE — 99284 EMERGENCY DEPT VISIT MOD MDM: CPT

## 2021-07-10 PROCEDURE — 73560 X-RAY EXAM OF KNEE 1 OR 2: CPT

## 2021-07-10 ASSESSMENT — PAIN DESCRIPTION - ORIENTATION: ORIENTATION: LEFT

## 2021-07-10 ASSESSMENT — PAIN SCALES - GENERAL
PAINLEVEL_OUTOF10: 10
PAINLEVEL_OUTOF10: 10

## 2021-07-10 NOTE — ED TRIAGE NOTES
[facility] 57 [facility] brought himself to the ER for eval of left knee pain. The patient states that when he walks he stumbles with his left knee and was told he will probably need a replacement but thinks he twisted two days ago walking. Pain a 10/10. The patient is alert and oriented with an open and patent airway with a normal respiratory effort.

## 2021-07-10 NOTE — ED PROVIDER NOTES
**ADVANCED PRACTICE PROVIDER, I HAVE EVALUATED THIS PATIENT**        [address]      Pt Name: Natividad Reeder  F:[id number]  Angie 1960  Date of evaluation: 7/10/2021  Provider: Winnie Hamm PA-C      Chief Complaint:    Chief Complaint   Patient presents with    Knee Pain     pt c/o left knee pain that got worse yesterday. pt. states that he needs a knee replacement but it kept him awake last night. Nursing Notes, Past Medical Hx, Past Surgical Hx, Social Hx, Allergies, and Family Hx were all reviewed and agreed with or any disagreements were addressed in the HPI.    HPI: (Location, Duration, Timing, Severity, Quality, Assoc Sx, Context, Modifying factors)    Chief Complaint of left knee pain    This is a  57 y.o. male who presents stating that he has a new issue in the left knee. He states that a couple of days ago he was walking, took a step with the left leg, felt something pop and it felt like the knee was a good to support him. He had to grab onto the cart he was next 2 to keep from falling. He states that has happened once or twice since then and generally the left knee is giving him a lot of trouble right now. He did have the right knee replaced some years ago. Patient indicates that he took some pain medication for the left knee just before coming in and that currently the pain is not bad but it gets to a 10 out of 10 when it pops or clicks or catches or near goes out. He states that he cannot cause any pain by pushing on any area of the knee and the initial swelling from a couple of days ago has gone down but the pain is mostly with stepping down and better at rest.  Therefore he came to be seen. No increased heat or redness bruising or deformity noted by patient.     PastMedical/Surgical History:      Diagnosis Date    Asthma     Chronic pain     DDD (degenerative disc disease), lumbar     Diabetes mellitus ([facility]     GERD (gastroesophageal reflux disease)     Hyperlipidemia     Hypertension     Morbid obesity with BMI of 40.0-44.9, adult ([facility] 5/20/2015    Osteoarthritis     Vitamin D deficiency          Procedure Laterality Date    FOOT SURGERY Left 4-    SHAKA / AKIN BUNIONECTOMY LEFT FOOT, EXCISE NEUROMA LEFT    FOOT SURGERY Left     JOINT REPLACEMENT Right     knee    KNEE ARTHROSCOPY      X 6    NERVE BLOCK Left     2019    TOTAL KNEE ARTHROPLASTY Right 1/6/15       Medications:  Previous Medications    ALBUTEROL (VENTOLIN HFA) 108 (90 BASE) MCG/ACT INHALER    Inhale 2 puffs into the lungs every 6 hours as needed for Wheezing    ASPIRIN 81 MG EC TABLET    Take 1 tablet by mouth daily. ATORVASTATIN (LIPITOR) 10 MG TABLET    Take 10 mg by mouth daily    GABAPENTIN (NEURONTIN) 800 MG TABLET    Take 1 tablet by mouth 2 times daily    GLIPIZIDE (GLUCOTROL) 5 MG TABLET    Take 5 mg by mouth daily    HYDROCHLOROTHIAZIDE (HYDRODIURIL) 25 MG TABLET    Take 1 qd    IBUPROFEN (ADVIL;MOTRIN) 800 MG TABLET    Take 1 tablet by mouth every 8 hours as needed for Pain    INSULIN GLARGINE (TOUJEO SOLOSTAR SC)    Inject 66 Int'l Units into the skin nightly     OMEPRAZOLE (PRILOSEC) 20 MG CAPSULE    Take 1 capsule by mouth daily    SULFAMETHOXAZOLE-TRIMETHOPRIM (BACTRIM DS;SEPTRA DS) 800-160 MG PER TABLET    Take 1 tablet by mouth 2 times daily         Review of Systems:  (2-9 systems needed)  Review of Systems    \"Positives and Pertinent negatives as per HPI\"    Physical Exam:  Physical Exam  Vitals and nursing note reviewed. Constitutional:       Appearance: Normal appearance. He is not diaphoretic. HENT:      Head: Normocephalic and atraumatic. Right Ear: External ear normal.      Left Ear: External ear normal.      Nose: Nose normal.   Eyes:      General:         Right eye: No discharge. Left eye: No discharge.       Conjunctiva/sclera: Conjunctivae normal.   Pulmonary:      Effort: Pulmonary effort is normal. No respiratory distress. Musculoskeletal:         General: No swelling or deformity. Normal range of motion. Cervical back: Normal range of motion and neck supple. Right lower leg: No edema. Left lower leg: No edema. Comments: On exam no left knee acute laxity varus or valgus with pressure or anterior or posterior. No acute deformity. No pain on palpation or movement by the provider with the left knee. No increased heat or redness compared to the right or any bruising or edema or obvious effusion. Distal pulses 2+ bilaterally in dorsalis pedis. Capillary fill brisk less than 1 second throughout. Patient states the pain is present in that left knee sometimes more medially or sometimes laterally and especially with stepping down on it and trying to use it normally. Skin:     General: Skin is warm and dry. Capillary Refill: Capillary refill takes less than 2 seconds. Findings: No bruising, erythema, lesion or rash. Neurological:      General: No focal deficit present. Mental Status: He is alert and oriented to person, place, and time. Mental status is at baseline. Psychiatric:         Mood and Affect: Mood normal.         Behavior: Behavior normal.         Judgment: Judgment normal.         MEDICAL DECISION MAKING    Vitals:    Vitals:    07/10/21 1503 07/10/21 1504   BP:  (!) 162/78   Pulse:  73   Resp:  16   Temp:  97.3 °F (36.3 °C)   SpO2:  96%   Weight: 294 lb 1.5 oz (133.4 kg)    Height:  6' (1.829 m)       LABS:Labs Reviewed - No data to display     Remainder of labs reviewed and were negative at this time or not returned at the time of this note.     RADIOLOGY:   Non-plain film images such as CT, Ultrasound and MRI are read by the radiologist. Leola Leija PA-C have directly visualized the radiologic plain film image(s) with the below findings:      Interpretation per the Radiologist below, if available at the time of this note:    XR KNEE LEFT (1-2 VIEWS)   Preliminary Result   1. No evidence of acute osseous abnormality. 2.  Moderate joint effusion. 3.  Mild-to-moderate degenerative changes, worst at the patellofemoral   compartment. XR KNEE LEFT (1-2 VIEWS)    Result Date: 7/10/2021  1. No evidence of acute osseous abnormality. 2.  Moderate joint effusion. 3.  Mild-to-moderate degenerative changes, worst at the patellofemoral compartment. MEDICAL DECISION MAKING / ED COURSE:      PROCEDURES:   Procedures    None    Patient was given:  Medications - No data to display  This patient presents as above and evaluation and treatment is begun here. Vital signs stable. X-rays of the left knee are obtained and interpreted by radiology as above. I also reviewed these noting significant degenerative changes and possible bony osteophytes versus possible free-floating bony particles. However no obvious acute fracture or dislocation. We will write for some Voltaren cream which patient states has helped his pains in the past and otherwise have him use his home medications and immobilizer and walking aids. Patient verbalizes understanding and agreement with the above plan and the following discharge home instructions. The patient tolerated their visit well. I evaluated the patient. The physician was available for consultation as needed. The patient and / or the family were informed of the results of any tests, a time was given to answer questions, a plan was proposed and they agreed with plan. Use your knee immobilizer and crutches or walker as needed, use the medication as written and do warm or cool compresses. Follow-up outpatient with orthopedist on Monday for next available appointment. Return to the emergency department for any emergency worsening or concern. CLINICAL IMPRESSION:  1. Osteoarthritis of left knee, unspecified osteoarthritis type    2.  Acute pain of left knee        DISPOSITION Decision To Discharge 07/10/2021 05:18:02 PM      PATIENT REFERRED TO:  [facility]  [address]  Call in 2 days        DISCHARGE MEDICATIONS:  New Prescriptions    DICLOFENAC SODIUM 1 % CREA    Apply 2 g topically every 6 hours as needed (pain/swelling) To the left knee       DISCONTINUED MEDICATIONS:  Discontinued Medications    DICLOFENAC SODIUM (VOLTAREN) 1 % GEL    Apply 2 g topically 4 times daily for 30 days.               (Please note the MDM and HPI sections of this note were completed with a voice recognition program.  Efforts were made to edit the dictations but occasionally words are mis-transcribed.)    Electronically signed, Lio Chavarria PA-C,          Lio Chavarria PA-C  07/10/21 7356

## 2021-07-10 NOTE — ED NOTES
.Pt discharged at this time. Discharge instructions and medications reviewed,  Questions were answered. PT verbalized understanding. Follow up appointments were discussed.          [facility]  07/10/21 1436

## 2021-07-13 ENCOUNTER — OFFICE VISIT (OUTPATIENT)
Dept: ORTHOPEDIC SURGERY | Age: 61
End: 2021-07-13
Payer: COMMERCIAL

## 2021-07-13 VITALS — BODY MASS INDEX: 39.28 KG/M2 | HEIGHT: 72 IN | WEIGHT: 290 LBS

## 2021-07-13 DIAGNOSIS — M25.562 CHRONIC PAIN OF LEFT KNEE: ICD-10-CM

## 2021-07-13 DIAGNOSIS — G89.29 CHRONIC PAIN OF LEFT KNEE: ICD-10-CM

## 2021-07-13 DIAGNOSIS — M17.12 PRIMARY OSTEOARTHRITIS OF LEFT KNEE: Primary | ICD-10-CM

## 2021-07-13 PROCEDURE — 99203 OFFICE O/P NEW LOW 30 MIN: CPT | Performed by: ORTHOPAEDIC SURGERY

## 2021-07-13 PROCEDURE — 20610 DRAIN/INJ JOINT/BURSA W/O US: CPT | Performed by: ORTHOPAEDIC SURGERY

## 2021-07-13 RX ORDER — BUPIVACAINE HYDROCHLORIDE 2.5 MG/ML
3 INJECTION, SOLUTION INFILTRATION; PERINEURAL ONCE
Status: COMPLETED | OUTPATIENT
Start: 2021-07-13 | End: 2021-07-13

## 2021-07-13 RX ORDER — OXYCODONE HYDROCHLORIDE AND ACETAMINOPHEN 5; 325 MG/1; MG/1
TABLET ORAL
COMMUNITY
Start: 2021-06-29

## 2021-07-13 RX ORDER — METHYLPREDNISOLONE ACETATE 40 MG/ML
80 INJECTION, SUSPENSION INTRA-ARTICULAR; INTRALESIONAL; INTRAMUSCULAR; SOFT TISSUE ONCE
Status: COMPLETED | OUTPATIENT
Start: 2021-07-13 | End: 2021-07-13

## 2021-07-13 RX ADMIN — METHYLPREDNISOLONE ACETATE 80 MG: 40 INJECTION, SUSPENSION INTRA-ARTICULAR; INTRALESIONAL; INTRAMUSCULAR; SOFT TISSUE at 11:17

## 2021-07-13 RX ADMIN — BUPIVACAINE HYDROCHLORIDE 7.5 MG: 2.5 INJECTION, SOLUTION INFILTRATION; PERINEURAL at 11:16

## 2021-07-13 NOTE — PROGRESS NOTES
Rodrigue Sharma  [id number]  July 13, 2021    Chief Complaint   Patient presents with    Knee Pain     Left       History: The patient is a 69-year-old gentleman who is here for evaluation of his left knee. He has had left knee pain for the past several months. He rates the pain as 10/10. He did undergo a right total knee arthroplasty back in 2015 and he did rather well. He has difficulty getting up from a seated position. He does have pain at nighttime. He has tried ibuprofen without much relief. He denies any specific injury. He denies any numbness or tingling. This is a consult from Maude Brunner, MD for left knee pain. The patient's  past medical history, medications, allergies,  family history, social history, and have been reviewed, and dated and are recorded in the chart. Pertinent items are noted in HPI. Review of systems reviewed from Pertinent History Form dated on 7/13/2021 and available in the patient's chart under the Media tab. Vitals:  Ht 6' (1.829 m)   Wt 290 lb (131.5 kg)   BMI 39.33 kg/m²     Physical: Mr. Rodrigue Sharma appears well, he is in no apparent distress, he demonstrates appropriate mood & affect. He is alert and oriented to person, place and time. Examination of the left lower extremity reveals no pain with range of motion of the hip. He has generalized tenderness to palpation about the joint line of the left knee. Range of motion is from 0 degrees to 120 degrees. Strength is 4+ to 5/5 for all muscle groups about the left knee. There is patellofemoral crepitus with range of motion of the left knee. Varus and valgus stressing of the knees reveals no evidence of instability. There is a small effusion in the left knee. Anterior drawer and Lachman are negative bilaterally. Examination of the skin reveals no rashes, ulceration, or lesion, bilaterally in the lower extremities. Sensation to both lower extremities is grossly intact.  Exam of both feet reveals pedal pulses intact and brisk cap refill. Patient is able to dorsiflex and wiggle all toes. Deep tendon reflexes of the lower extremities are normal and symmetric. X-rays: 2 views of the left knee obtained in the office today and 2 views of the left knee obtained in the emergency room were extensively reviewed. The patient has severe osteoarthritis. The changes are most severe medially. Impression: Left knee osteoarthritis    Plan: At this time, the left knee was injected under sterile conditions. After a Betadine prep of the joint, 3 cc of 0.25% Marcaine and 2 cc of 40 mg Depo-medrol were injected into the knee. The patient tolerated the injection rather well. The patient was instructed to follow up for any signs of infection. Natural history and expected course discussed. Questions answered. Reduction in offending activity. OTC analgesics as needed. The patient will follow up with me in 6 weeks. If the patient has continued knee pain, we will consider repeat injection versus Visco supplement injections.

## 2022-03-19 ENCOUNTER — HOSPITAL ENCOUNTER (EMERGENCY)
Age: 62
Discharge: HOME | End: 2022-03-19
Attending: EMERGENCY MEDICINE
Payer: COMMERCIAL

## 2022-03-19 VITALS
DIASTOLIC BLOOD PRESSURE: 80 MMHG | TEMPERATURE: 97.9 F | RESPIRATION RATE: 16 BRPM | BODY MASS INDEX: 39.39 KG/M2 | OXYGEN SATURATION: 98 % | HEIGHT: 72 IN | WEIGHT: 290.79 LBS | HEART RATE: 50 BPM | SYSTOLIC BLOOD PRESSURE: 140 MMHG

## 2022-03-19 DIAGNOSIS — M54.32 LEFT SIDED SCIATICA: ICD-10-CM

## 2022-03-19 DIAGNOSIS — M54.50 ACUTE EXACERBATION OF CHRONIC LOW BACK PAIN: Primary | ICD-10-CM

## 2022-03-19 DIAGNOSIS — G89.29 ACUTE EXACERBATION OF CHRONIC LOW BACK PAIN: Primary | ICD-10-CM

## 2022-03-19 LAB
BILIRUBIN URINE: NEGATIVE
BLOOD, URINE: NEGATIVE
CLARITY: CLEAR
COLOR: YELLOW
COMMENT UA: ABNORMAL
EPITHELIAL CELLS, UA: 4 /HPF (ref 0–5)
GLUCOSE URINE: >=1000 MG/DL
HYALINE CASTS: ABNORMAL /LPF (ref 0–2)
KETONES, URINE: NEGATIVE MG/DL
LEUKOCYTE ESTERASE, URINE: NEGATIVE
MICROSCOPIC EXAMINATION: YES
NITRITE, URINE: NEGATIVE
PH UA: 5.5 (ref 5–8)
PROTEIN UA: 30 MG/DL
RBC UA: 0 /HPF (ref 0–4)
SPECIFIC GRAVITY UA: >1.03 (ref 1–1.03)
URINE REFLEX TO CULTURE: ABNORMAL
URINE TYPE: ABNORMAL
UROBILINOGEN, URINE: 1 E.U./DL
WBC UA: 8 /HPF (ref 0–5)

## 2022-03-19 PROCEDURE — 99283 EMERGENCY DEPT VISIT LOW MDM: CPT

## 2022-03-19 PROCEDURE — 81001 URINALYSIS AUTO W/SCOPE: CPT

## 2022-03-19 RX ORDER — LIDOCAINE 50 MG/G
1 PATCH TOPICAL DAILY
Qty: 10 PATCH | Refills: 0 | Status: SHIPPED | OUTPATIENT
Start: 2022-03-19 | End: 2022-03-29

## 2022-03-19 RX ORDER — NAPROXEN 375 MG/1
375 TABLET ORAL 2 TIMES DAILY WITH MEALS
Qty: 30 TABLET | Refills: 0 | Status: SHIPPED | OUTPATIENT
Start: 2022-03-19

## 2022-03-19 RX ORDER — METHYLPREDNISOLONE 4 MG/1
TABLET ORAL
Qty: 1 KIT | Refills: 0 | Status: SHIPPED | OUTPATIENT
Start: 2022-03-19

## 2022-03-19 ASSESSMENT — PAIN DESCRIPTION - ORIENTATION: ORIENTATION: LEFT;LOWER

## 2022-03-19 ASSESSMENT — PAIN SCALES - GENERAL: PAINLEVEL_OUTOF10: 10

## 2022-03-19 ASSESSMENT — PAIN DESCRIPTION - FREQUENCY: FREQUENCY: CONTINUOUS

## 2022-03-19 ASSESSMENT — PAIN - FUNCTIONAL ASSESSMENT: PAIN_FUNCTIONAL_ASSESSMENT: 0-10

## 2022-03-19 ASSESSMENT — PAIN DESCRIPTION - PAIN TYPE: TYPE: ACUTE PAIN

## 2022-03-19 ASSESSMENT — PAIN DESCRIPTION - LOCATION: LOCATION: FLANK

## 2022-03-19 ASSESSMENT — PAIN DESCRIPTION - DESCRIPTORS: DESCRIPTORS: SHARP

## 2022-03-19 NOTE — ED PROVIDER NOTES
CHIEF COMPLAINT  Flank Pain (Lower left back/flank area pain. Onset approximately a week ago. No difficulty urinating or blood in urine. States hx of kidney stones. )      HISTORY OF PRESENT ILLNESS  Jany Hernandez is a 60 y.o. male who  has a past medical history of Asthma, Chronic pain, DDD (degenerative disc disease), lumbar, Diabetes mellitus ([address] GERD (gastroesophageal reflux disease), Hyperlipidemia, Hypertension, Morbid obesity with BMI of 40.0-44.9, adult ([address] Osteoarthritis, and Vitamin D deficiency. presents to the ED complaining of low back pain and flank pain that is been present over the past week. Patient states he mainly notices the pain when twisting or going from sitting to standing. Patient states the pain is a sore sensation that does not radiate. States he has history of chronic back pain and this feels like an exacerbation of it. Denies any dysuria increasing frequency of urination. No blood in the urine. Denies any abdominal pain. No fevers or chills. No nausea or vomiting. States he follows with pain management for chronic back pain and does receive injections as well as takes Percocet daily. States he has had continued back pain despite taking his Percocet as prescribed. No falls or trauma. No numbness or weakness. No bowel or bladder incontinence. No other complaints, modifying factors or associated symptoms. Nursing notes reviewed.    Past Medical History:   Diagnosis Date    Asthma     Chronic pain     DDD (degenerative disc disease), lumbar     Diabetes mellitus ([address]     GERD (gastroesophageal reflux disease)     Hyperlipidemia     Hypertension     Morbid obesity with BMI of 40.0-44.9, adult ([address] 5/20/2015    Osteoarthritis     Vitamin D deficiency      Past Surgical History:   Procedure Laterality Date    FOOT SURGERY Left 4-    SHAKA / AKIN BUNIONECTOMY LEFT FOOT, EXCISE NEUROMA LEFT    FOOT SURGERY Left     JOINT REPLACEMENT Right     knee  KNEE ARTHROSCOPY      X 6    NERVE BLOCK Left     2019    TOTAL KNEE ARTHROPLASTY Right 1/6/15     Family History   Problem Relation Age of Onset    Heart Disease Father     Diabetes Mother     Heart Disease Mother      Social History     Socioeconomic History    Marital status: Single     Spouse name: Not on file    Number of children: 10    Years of education: Not on file    Highest education level: Not on file   Occupational History    Occupation: disabled     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   Tobacco Use    Smoking status: Never Smoker    Smokeless tobacco: Never Used   Vaping Use    Vaping Use: Never used   Substance and Sexual Activity    Alcohol use: No    Drug use: No    Sexual activity: Not Currently     Partners: Female   Other Topics Concern    Not on file   Social History Narrative    Not on file     Social Determinants of Health     Financial Resource Strain:     Difficulty of Paying Living Expenses: Not on file   Food Insecurity:     Worried About Running Out of Food in the Last Year: Not on file    Dustin of Food in the Last Year: Not on file   Transportation Needs:     Lack of Transportation (Medical): Not on file    Lack of Transportation (Non-Medical):  Not on file   Physical Activity:     Days of Exercise per Week: Not on file    Minutes of Exercise per Session: Not on file   Stress:     Feeling of Stress : Not on file   Social Connections:     Frequency of Communication with Friends and Family: Not on file    Frequency of Social Gatherings with Friends and Family: Not on file    Attends Bahai Services: Not on file    Active Member of Clubs or Organizations: Not on file    Attends Club or Organization Meetings: Not on file    Marital Status: Not on file   Intimate Partner Violence:     Fear of Current or Ex-Partner: Not on file    Emotionally Abused: Not on file    Physically Abused: Not on file    Sexually Abused: Not on file   Housing Stability:     Unable to Pay for Housing in the Last Year: Not on file    Number of Places Lived in the Last Year: Not on file    Unstable Housing in the Last Year: Not on file     No current facility-administered medications for this encounter. Current Outpatient Medications   Medication Sig Dispense Refill    lidocaine (LIDODERM) 5 % Place 1 patch onto the skin daily for 10 days 12 hours on, 12 hours off. 10 patch 0    naproxen (NAPROSYN) 375 MG tablet Take 1 tablet by mouth 2 times daily (with meals) 30 tablet 0    methylPREDNISolone (MEDROL, JOE,) 4 MG tablet Take by mouth. 1 kit 0    oxyCODONE-acetaminophen (PERCOCET) 5-325 MG per tablet       Diclofenac Sodium 1 % CREA Apply 2 g topically every 6 hours as needed (pain/swelling) To the left knee 120 g 0    ibuprofen (ADVIL;MOTRIN) 800 MG tablet Take 1 tablet by mouth every 8 hours as needed for Pain 30 tablet 0    glipiZIDE (GLUCOTROL) 5 MG tablet Take 5 mg by mouth daily      atorvastatin (LIPITOR) 10 MG tablet Take 10 mg by mouth daily      Insulin Glargine (TOUJEO SOLOSTAR SC) Inject 66 Int'l Units into the skin nightly       gabapentin (NEURONTIN) 800 MG tablet Take 1 tablet by mouth 2 times daily 60 tablet 2    albuterol (VENTOLIN HFA) 108 (90 BASE) MCG/ACT inhaler Inhale 2 puffs into the lungs every 6 hours as needed for Wheezing 1 Inhaler 2    hydrochlorothiazide (HYDRODIURIL) 25 MG tablet Take 1 qd 30 tablet 2    omeprazole (PRILOSEC) 20 MG capsule Take 1 capsule by mouth daily 30 capsule 2    aspirin 81 MG EC tablet Take 1 tablet by mouth daily.  30 tablet 5     No Known Allergies      REVIEW OF SYSTEMS  10 systems reviewed, pertinent positives per HPI otherwise noted to be negative    PHYSICAL EXAM  BP (!) 140/80   Pulse 50   Temp 97.9 °F (36.6 °C) (Oral)   Resp 16   Ht 6' (1.829 m)   Wt 290 lb 12.6 oz (131.9 kg)   SpO2 98%   BMI 39.44 kg/m²      CONSTITUTIONAL: AOx4, cooperative with exam, afebrile   HEAD: normocephalic, on results. Plan for discharge with naproxen, Lidoderm patches and Medrol Dosepak. [DS]      ED Course User Index  [DS] Akiko Azul MD     I estimate there is LOW risk for ABDOMINAL AORTIC ANEURYSM, CAUDA EQUINA SYNDROME, EPIDURAL MASS LESION, SPINAL STENOSIS, OR HERNIATED DISK CAUSING SEVERE STENOSIS, thus I consider the discharge disposition reasonable. Gatito Woodson and I have discussed the diagnosis and risks, and we agree with discharging home to follow-up with their primary doctor. We also discussed returning to the Emergency Department immediately if new or worsening symptoms occur. We have discussed the symptoms which are most concerning (e.g., saddle anesthesia, urinary or bowel incontinence or retention, changing or worsening pain) that necessitate immediate return. Patient was given scripts for the following medications. I counseled patient how to take these medications. Discharge Medication List as of 3/19/2022  1:57 PM      START taking these medications    Details   lidocaine (LIDODERM) 5 % Place 1 patch onto the skin daily for 10 days 12 hours on, 12 hours off., Disp-10 patch, R-0Normal      naproxen (NAPROSYN) 375 MG tablet Take 1 tablet by mouth 2 times daily (with meals), Disp-30 tablet, R-0Normal      methylPREDNISolone (MEDROL, JOE,) 4 MG tablet Take by mouth., Disp-1 kit, R-0Normal                 CLINICAL IMPRESSION  1. Acute exacerbation of chronic low back pain    2. Left sided sciatica        Blood pressure (!) 140/80, pulse 50, temperature 97.9 °F (36.6 °C), temperature source Oral, resp. rate 16, height 6' (1.829 m), weight 290 lb 12.6 oz (131.9 kg), SpO2 98 %. DISPOSITION  Patient was discharged to home in good condition. Lo Jean MD  [address]  [phone]    Call today  For a follow up appointment. Disclaimer: All medical record entries made by [employer] dictation.       (Please note that this note was completed with a voice recognition program. Every attempt was made to edit the dictations, but inevitably there remain words that are mis-transcribed.)           Nanda Dunne MD  03/19/22 1383

## 2022-03-19 NOTE — ED NOTES
D/C: Order noted for d/c. Pt confirmed d/c paperwork and RX have correct name. Discharge and education instructions reviewed with patient. Teach-back successful. Pt verbalized understanding and signed d/c papers. Pt denied questions at this time. No acute distress noted. Patient instructed to follow-up as noted - return to emergency department if symptoms worsen. Patient verbalized understanding. Discharged per EDMD with discharge instructions. Pt discharged to private vehicle. Patient stable upon departure. Thanked patient for choosing [facility] for care. Provider aware of patient pain at time of discharge.      Erin Butcher RN  03/19/22 4518

## 2022-10-02 ENCOUNTER — APPOINTMENT (OUTPATIENT)
Dept: GENERAL RADIOLOGY | Age: 62
End: 2022-10-02
Payer: COMMERCIAL

## 2022-10-02 ENCOUNTER — HOSPITAL ENCOUNTER (EMERGENCY)
Age: 62
Discharge: STILL ADMITTED OR ADMINISTRATIVE | End: 2022-10-02

## 2022-10-02 ENCOUNTER — HOSPITAL ENCOUNTER (OUTPATIENT)
Age: 62
Discharge: HOME | End: 2022-10-02
Payer: COMMERCIAL

## 2022-10-02 DIAGNOSIS — M51.36 DEGENERATION OF LUMBAR INTERVERTEBRAL DISC: ICD-10-CM

## 2022-10-02 PROCEDURE — 72110 X-RAY EXAM L-2 SPINE 4/>VWS: CPT

## 2023-11-08 ENCOUNTER — TELEPHONE (OUTPATIENT)
Dept: PULMONOLOGY | Age: 63
End: 2023-11-08

## 2024-05-21 ENCOUNTER — HOSPITAL ENCOUNTER (INPATIENT)
Age: 64
LOS: 3 days | Discharge: HOME | End: 2024-05-24
Attending: INTERNAL MEDICINE | Admitting: INTERNAL MEDICINE
Payer: MEDICARE

## 2024-05-21 ENCOUNTER — APPOINTMENT (OUTPATIENT)
Dept: GENERAL RADIOLOGY | Age: 64
End: 2024-05-21
Payer: MEDICARE

## 2024-05-21 DIAGNOSIS — L08.9 DIABETIC FOOT INFECTION (HCC): ICD-10-CM

## 2024-05-21 DIAGNOSIS — E11.628 DIABETIC FOOT INFECTION (HCC): ICD-10-CM

## 2024-05-21 DIAGNOSIS — R73.9 HYPERGLYCEMIA: ICD-10-CM

## 2024-05-21 DIAGNOSIS — I10 HTN (HYPERTENSION): Chronic | ICD-10-CM

## 2024-05-21 DIAGNOSIS — Z79.4 TYPE 2 DIABETES MELLITUS WITH HYPERGLYCEMIA, WITH LONG-TERM CURRENT USE OF INSULIN (HCC): Chronic | ICD-10-CM

## 2024-05-21 DIAGNOSIS — S91.302A OPEN WOUND OF LEFT FOOT WITH COMPLICATION, INITIAL ENCOUNTER: Primary | ICD-10-CM

## 2024-05-21 DIAGNOSIS — E11.65 TYPE 2 DIABETES MELLITUS WITH HYPERGLYCEMIA, WITH LONG-TERM CURRENT USE OF INSULIN (HCC): Chronic | ICD-10-CM

## 2024-05-21 LAB
ALBUMIN SERPL-MCNC: 3.6 G/DL (ref 3.4–5)
ALBUMIN/GLOB SERPL: 1 {RATIO} (ref 1.1–2.2)
ALP SERPL-CCNC: 133 U/L (ref 40–129)
ALT SERPL-CCNC: 11 U/L (ref 10–40)
ANION GAP SERPL CALCULATED.3IONS-SCNC: 4 MMOL/L (ref 3–16)
AST SERPL-CCNC: 11 U/L (ref 15–37)
BASOPHILS # BLD: 0.1 K/UL (ref 0–0.2)
BASOPHILS NFR BLD: 1.2 %
BILIRUB SERPL-MCNC: 0.4 MG/DL (ref 0–1)
BUN SERPL-MCNC: 14 MG/DL (ref 7–20)
CALCIUM SERPL-MCNC: 8.5 MG/DL (ref 8.3–10.6)
CHLORIDE SERPL-SCNC: 96 MMOL/L (ref 99–110)
CO2 SERPL-SCNC: 40 MMOL/L (ref 21–32)
CREAT SERPL-MCNC: 1.1 MG/DL (ref 0.8–1.3)
CRP SERPL-MCNC: 21 MG/L (ref 0–5.1)
DEPRECATED RDW RBC AUTO: 15.7 % (ref 12.4–15.4)
EOSINOPHIL # BLD: 0.3 K/UL (ref 0–0.6)
EOSINOPHIL NFR BLD: 2.8 %
ERYTHROCYTE [SEDIMENTATION RATE] IN BLOOD BY WESTERGREN METHOD: 52 MM/HR (ref 0–20)
GFR SERPLBLD CREATININE-BSD FMLA CKD-EPI: 74 ML/MIN/{1.73_M2}
GLUCOSE BLD-MCNC: 329 MG/DL (ref 70–99)
GLUCOSE SERPL-MCNC: 282 MG/DL (ref 70–99)
HCT VFR BLD AUTO: 44.9 % (ref 40.5–52.5)
HGB BLD-MCNC: 15.2 G/DL (ref 13.5–17.5)
LACTATE BLDV-SCNC: 1.7 MMOL/L (ref 0.4–1.9)
LACTATE BLDV-SCNC: 2 MMOL/L (ref 0.4–1.9)
LYMPHOCYTES # BLD: 1.7 K/UL (ref 1–5.1)
LYMPHOCYTES NFR BLD: 15.8 %
MCH RBC QN AUTO: 27.7 PG (ref 26–34)
MCHC RBC AUTO-ENTMCNC: 33.7 G/DL (ref 31–36)
MCV RBC AUTO: 82 FL (ref 80–100)
MONOCYTES # BLD: 0.9 K/UL (ref 0–1.3)
MONOCYTES NFR BLD: 8.7 %
NEUTROPHILS # BLD: 7.8 K/UL (ref 1.7–7.7)
NEUTROPHILS NFR BLD: 71.5 %
PERFORMED ON: ABNORMAL
PLATELET # BLD AUTO: 204 K/UL (ref 135–450)
PMV BLD AUTO: 9.3 FL (ref 5–10.5)
POTASSIUM SERPL-SCNC: 3.3 MMOL/L (ref 3.5–5.1)
PROT SERPL-MCNC: 7.2 G/DL (ref 6.4–8.2)
RBC # BLD AUTO: 5.48 M/UL (ref 4.2–5.9)
SODIUM SERPL-SCNC: 140 MMOL/L (ref 136–145)
WBC # BLD AUTO: 10.9 K/UL (ref 4–11)

## 2024-05-21 PROCEDURE — 96365 THER/PROPH/DIAG IV INF INIT: CPT

## 2024-05-21 PROCEDURE — 85652 RBC SED RATE AUTOMATED: CPT

## 2024-05-21 PROCEDURE — 73630 X-RAY EXAM OF FOOT: CPT

## 2024-05-21 PROCEDURE — 80053 COMPREHEN METABOLIC PANEL: CPT

## 2024-05-21 PROCEDURE — 86140 C-REACTIVE PROTEIN: CPT

## 2024-05-21 PROCEDURE — 83605 ASSAY OF LACTIC ACID: CPT

## 2024-05-21 PROCEDURE — 36415 COLL VENOUS BLD VENIPUNCTURE: CPT

## 2024-05-21 PROCEDURE — 99285 EMERGENCY DEPT VISIT HI MDM: CPT

## 2024-05-21 PROCEDURE — 1200000000 HC SEMI PRIVATE

## 2024-05-21 PROCEDURE — 83036 HEMOGLOBIN GLYCOSYLATED A1C: CPT

## 2024-05-21 PROCEDURE — 6360000002 HC RX W HCPCS: Performed by: INTERNAL MEDICINE

## 2024-05-21 PROCEDURE — 85025 COMPLETE CBC W/AUTO DIFF WBC: CPT

## 2024-05-21 PROCEDURE — 2580000003 HC RX 258: Performed by: INTERNAL MEDICINE

## 2024-05-21 PROCEDURE — 2580000003 HC RX 258: Performed by: PHYSICIAN ASSISTANT

## 2024-05-21 PROCEDURE — 6370000000 HC RX 637 (ALT 250 FOR IP): Performed by: INTERNAL MEDICINE

## 2024-05-21 PROCEDURE — 6360000002 HC RX W HCPCS: Performed by: PHYSICIAN ASSISTANT

## 2024-05-21 RX ORDER — INSULIN GLARGINE 100 [IU]/ML
53 INJECTION, SOLUTION SUBCUTANEOUS EVERY MORNING
Status: DISCONTINUED | OUTPATIENT
Start: 2024-05-22 | End: 2024-05-21

## 2024-05-21 RX ORDER — SODIUM CHLORIDE 0.9 % (FLUSH) 0.9 %
5-40 SYRINGE (ML) INJECTION EVERY 12 HOURS SCHEDULED
Status: DISCONTINUED | OUTPATIENT
Start: 2024-05-21 | End: 2024-05-24 | Stop reason: HOSPADM

## 2024-05-21 RX ORDER — 0.9 % SODIUM CHLORIDE 0.9 %
1000 INTRAVENOUS SOLUTION INTRAVENOUS ONCE
Status: DISCONTINUED | OUTPATIENT
Start: 2024-05-21 | End: 2024-05-22

## 2024-05-21 RX ORDER — ATENOLOL 100 MG/1
100 TABLET ORAL DAILY
Status: ON HOLD | COMMUNITY
End: 2024-05-24

## 2024-05-21 RX ORDER — ONDANSETRON 4 MG/1
4 TABLET, ORALLY DISINTEGRATING ORAL EVERY 8 HOURS PRN
Status: DISCONTINUED | OUTPATIENT
Start: 2024-05-21 | End: 2024-05-24 | Stop reason: HOSPADM

## 2024-05-21 RX ORDER — FLUTICASONE PROPIONATE 50 MCG
1 SPRAY, SUSPENSION (ML) NASAL DAILY
COMMUNITY

## 2024-05-21 RX ORDER — SODIUM CHLORIDE 0.9 % (FLUSH) 0.9 %
5-40 SYRINGE (ML) INJECTION PRN
Status: DISCONTINUED | OUTPATIENT
Start: 2024-05-21 | End: 2024-05-24 | Stop reason: HOSPADM

## 2024-05-21 RX ORDER — 0.9 % SODIUM CHLORIDE 0.9 %
1000 INTRAVENOUS SOLUTION INTRAVENOUS ONCE
Status: COMPLETED | OUTPATIENT
Start: 2024-05-21 | End: 2024-05-21

## 2024-05-21 RX ORDER — ONDANSETRON 2 MG/ML
4 INJECTION INTRAMUSCULAR; INTRAVENOUS EVERY 6 HOURS PRN
Status: DISCONTINUED | OUTPATIENT
Start: 2024-05-21 | End: 2024-05-24 | Stop reason: HOSPADM

## 2024-05-21 RX ORDER — FLUTICASONE FUROATE, UMECLIDINIUM BROMIDE AND VILANTEROL TRIFENATATE 100; 62.5; 25 UG/1; UG/1; UG/1
1 POWDER RESPIRATORY (INHALATION) DAILY
COMMUNITY

## 2024-05-21 RX ORDER — AMITRIPTYLINE HYDROCHLORIDE 10 MG/1
10 TABLET, FILM COATED ORAL EVERY MORNING
COMMUNITY

## 2024-05-21 RX ORDER — DEXTROSE MONOHYDRATE 100 MG/ML
INJECTION, SOLUTION INTRAVENOUS CONTINUOUS PRN
Status: DISCONTINUED | OUTPATIENT
Start: 2024-05-21 | End: 2024-05-24 | Stop reason: HOSPADM

## 2024-05-21 RX ORDER — POLYETHYLENE GLYCOL 3350 17 G/17G
17 POWDER, FOR SOLUTION ORAL DAILY PRN
Status: DISCONTINUED | OUTPATIENT
Start: 2024-05-21 | End: 2024-05-24 | Stop reason: HOSPADM

## 2024-05-21 RX ORDER — ATORVASTATIN CALCIUM 10 MG/1
10 TABLET, FILM COATED ORAL DAILY
Status: DISCONTINUED | OUTPATIENT
Start: 2024-05-22 | End: 2024-05-24 | Stop reason: HOSPADM

## 2024-05-21 RX ORDER — GLUCAGON 1 MG/ML
1 KIT INJECTION PRN
Status: DISCONTINUED | OUTPATIENT
Start: 2024-05-21 | End: 2024-05-24 | Stop reason: HOSPADM

## 2024-05-21 RX ORDER — GABAPENTIN 800 MG/1
800 TABLET ORAL 2 TIMES DAILY
Status: DISCONTINUED | OUTPATIENT
Start: 2024-05-21 | End: 2024-05-21

## 2024-05-21 RX ORDER — OMEPRAZOLE 40 MG/1
40 CAPSULE, DELAYED RELEASE ORAL DAILY
COMMUNITY

## 2024-05-21 RX ORDER — ACETAMINOPHEN 325 MG/1
650 TABLET ORAL EVERY 6 HOURS PRN
Status: DISCONTINUED | OUTPATIENT
Start: 2024-05-21 | End: 2024-05-24 | Stop reason: HOSPADM

## 2024-05-21 RX ORDER — HYDROCHLOROTHIAZIDE 25 MG/1
25 TABLET ORAL DAILY
Status: DISCONTINUED | OUTPATIENT
Start: 2024-05-22 | End: 2024-05-24

## 2024-05-21 RX ORDER — AMITRIPTYLINE HYDROCHLORIDE 10 MG/1
10 TABLET, FILM COATED ORAL DAILY
Status: DISCONTINUED | OUTPATIENT
Start: 2024-05-22 | End: 2024-05-24 | Stop reason: HOSPADM

## 2024-05-21 RX ORDER — POTASSIUM CHLORIDE 20 MEQ/1
40 TABLET, EXTENDED RELEASE ORAL ONCE
Status: DISCONTINUED | OUTPATIENT
Start: 2024-05-21 | End: 2024-05-24 | Stop reason: HOSPADM

## 2024-05-21 RX ORDER — INSULIN LISPRO 100 [IU]/ML
0-4 INJECTION, SOLUTION INTRAVENOUS; SUBCUTANEOUS NIGHTLY
Status: DISCONTINUED | OUTPATIENT
Start: 2024-05-21 | End: 2024-05-24 | Stop reason: HOSPADM

## 2024-05-21 RX ORDER — OXYCODONE HYDROCHLORIDE AND ACETAMINOPHEN 5; 325 MG/1; MG/1
1 TABLET ORAL EVERY 8 HOURS PRN
Status: DISCONTINUED | OUTPATIENT
Start: 2024-05-21 | End: 2024-05-24 | Stop reason: HOSPADM

## 2024-05-21 RX ORDER — ATENOLOL 50 MG/1
100 TABLET ORAL DAILY
Status: DISCONTINUED | OUTPATIENT
Start: 2024-05-22 | End: 2024-05-24 | Stop reason: HOSPADM

## 2024-05-21 RX ORDER — ACETAMINOPHEN 650 MG/1
650 SUPPOSITORY RECTAL EVERY 6 HOURS PRN
Status: DISCONTINUED | OUTPATIENT
Start: 2024-05-21 | End: 2024-05-24 | Stop reason: HOSPADM

## 2024-05-21 RX ORDER — VANCOMYCIN 2 G/400ML
2000 INJECTION, SOLUTION INTRAVENOUS ONCE
Status: COMPLETED | OUTPATIENT
Start: 2024-05-21 | End: 2024-05-22

## 2024-05-21 RX ORDER — ALBUTEROL SULFATE 90 UG/1
2 AEROSOL, METERED RESPIRATORY (INHALATION) EVERY 6 HOURS PRN
Status: DISCONTINUED | OUTPATIENT
Start: 2024-05-21 | End: 2024-05-24 | Stop reason: HOSPADM

## 2024-05-21 RX ORDER — POTASSIUM CHLORIDE 20 MEQ/1
40 TABLET, EXTENDED RELEASE ORAL PRN
Status: DISCONTINUED | OUTPATIENT
Start: 2024-05-21 | End: 2024-05-24 | Stop reason: HOSPADM

## 2024-05-21 RX ORDER — PANTOPRAZOLE SODIUM 40 MG/1
40 TABLET, DELAYED RELEASE ORAL
Status: DISCONTINUED | OUTPATIENT
Start: 2024-05-22 | End: 2024-05-24 | Stop reason: HOSPADM

## 2024-05-21 RX ORDER — FLUTICASONE PROPIONATE 50 MCG
1 SPRAY, SUSPENSION (ML) NASAL DAILY
Status: DISCONTINUED | OUTPATIENT
Start: 2024-05-22 | End: 2024-05-24 | Stop reason: HOSPADM

## 2024-05-21 RX ORDER — MAGNESIUM SULFATE IN WATER 40 MG/ML
2000 INJECTION, SOLUTION INTRAVENOUS PRN
Status: DISCONTINUED | OUTPATIENT
Start: 2024-05-21 | End: 2024-05-24 | Stop reason: HOSPADM

## 2024-05-21 RX ORDER — POTASSIUM CHLORIDE 7.45 MG/ML
10 INJECTION INTRAVENOUS PRN
Status: DISCONTINUED | OUTPATIENT
Start: 2024-05-21 | End: 2024-05-24 | Stop reason: HOSPADM

## 2024-05-21 RX ORDER — SODIUM CHLORIDE 9 MG/ML
INJECTION, SOLUTION INTRAVENOUS PRN
Status: DISCONTINUED | OUTPATIENT
Start: 2024-05-21 | End: 2024-05-24 | Stop reason: HOSPADM

## 2024-05-21 RX ORDER — INSULIN LISPRO 100 [IU]/ML
0-4 INJECTION, SOLUTION INTRAVENOUS; SUBCUTANEOUS
Status: DISCONTINUED | OUTPATIENT
Start: 2024-05-21 | End: 2024-05-24 | Stop reason: HOSPADM

## 2024-05-21 RX ORDER — ENOXAPARIN SODIUM 100 MG/ML
30 INJECTION SUBCUTANEOUS 2 TIMES DAILY
Status: DISCONTINUED | OUTPATIENT
Start: 2024-05-21 | End: 2024-05-24 | Stop reason: HOSPADM

## 2024-05-21 RX ORDER — SODIUM CHLORIDE 9 MG/ML
INJECTION, SOLUTION INTRAVENOUS CONTINUOUS
Status: ACTIVE | OUTPATIENT
Start: 2024-05-21 | End: 2024-05-22

## 2024-05-21 RX ORDER — ASPIRIN 81 MG/1
81 TABLET ORAL DAILY
Status: DISCONTINUED | OUTPATIENT
Start: 2024-05-21 | End: 2024-05-24 | Stop reason: HOSPADM

## 2024-05-21 RX ADMIN — VANCOMYCIN 2000 MG: 2 INJECTION, SOLUTION INTRAVENOUS at 22:06

## 2024-05-21 RX ADMIN — CEFEPIME 2000 MG: 2 INJECTION, POWDER, FOR SOLUTION INTRAVENOUS at 16:03

## 2024-05-21 RX ADMIN — ASPIRIN 81 MG: 81 TABLET, COATED ORAL at 21:55

## 2024-05-21 RX ADMIN — SODIUM CHLORIDE: 9 INJECTION, SOLUTION INTRAVENOUS at 22:05

## 2024-05-21 RX ADMIN — SODIUM CHLORIDE 1000 ML: 9 INJECTION, SOLUTION INTRAVENOUS at 16:01

## 2024-05-21 RX ADMIN — SODIUM CHLORIDE, PRESERVATIVE FREE 10 ML: 5 INJECTION INTRAVENOUS at 21:59

## 2024-05-21 ASSESSMENT — PAIN SCALES - GENERAL
PAINLEVEL_OUTOF10: 0
PAINLEVEL_OUTOF10: 10
PAINLEVEL_OUTOF10: 10

## 2024-05-21 ASSESSMENT — ENCOUNTER SYMPTOMS
WHEEZING: 0
NAUSEA: 0
SHORTNESS OF BREATH: 0
DIARRHEA: 0
RHINORRHEA: 0
COUGH: 0
VOMITING: 0
ABDOMINAL PAIN: 0

## 2024-05-21 ASSESSMENT — PAIN DESCRIPTION - LOCATION: LOCATION: TOE (COMMENT WHICH ONE)

## 2024-05-21 ASSESSMENT — PAIN - FUNCTIONAL ASSESSMENT: PAIN_FUNCTIONAL_ASSESSMENT: 0-10

## 2024-05-21 ASSESSMENT — PAIN DESCRIPTION - ORIENTATION
ORIENTATION: LEFT
ORIENTATION: LEFT

## 2024-05-21 ASSESSMENT — PAIN DESCRIPTION - FREQUENCY: FREQUENCY: CONTINUOUS

## 2024-05-21 NOTE — H&P
Hospital Medicine History & Physical      PCP: Kendall Varela MD    Date of Admission: 5/21/2024    Date of Service: Pt seen/examined on 05/21/2024 and Admitted to Inpatient with expected LOS greater than two midnights due to medical therapy.     Chief Complaint: Foot ulcers edema      History Of Present Illness:     64 y.o. male who presented to [facility] with foot ulcers with edema which is worsening, he did have 2 ulcers on his plantar aspect and fifth toe, getting worse denies fever chills stated edema get worse recently no nausea vomiting denies cough tenderness no diarrhea nausea vomiting no headache or blurry vision.  In ED found to have borderline elevated lactic acid elevated CRP and hypokalemia, discussed with ED provider agree with plan to admit for further management and treatment  Past Medical History:          Diagnosis Date    Asthma     Chronic pain     DDD (degenerative disc disease), lumbar     Diabetes mellitus (HCC)     GERD (gastroesophageal reflux disease)     Hyperlipidemia     Hypertension     Morbid obesity with BMI of 40.0-44.9, adult ([facility]) 5/20/2015    Osteoarthritis     Vitamin D deficiency        Past Surgical History:          Procedure Laterality Date    FOOT SURGERY Left 4-    SHAKA / AKIN BUNIONECTOMY LEFT FOOT, EXCISE NEUROMA LEFT    FOOT SURGERY Left     JOINT REPLACEMENT Right     knee    KNEE ARTHROSCOPY      X 6    NERVE BLOCK Left     2019    TOTAL KNEE ARTHROPLASTY Right 1/6/15       Medications Prior to Admission:      Prior to Admission medications    Medication Sig Start Date End Date Taking? Authorizing Provider   amitriptyline (ELAVIL) 10 MG tablet Take 1 tablet by mouth nightly   Yes ProviderEtta MD   atenolol (TENORMIN) 100 MG tablet Take 1 tablet by mouth daily   Yes ProviderEtta MD   omeprazole (PRILOSEC) 40 MG delayed release capsule Take 1 capsule by mouth daily   Yes Etta Simpson MD   fluticasone (FLONASE) 50 MCG/ACT nasal

## 2024-05-21 NOTE — ED PROVIDER NOTES
deterioration in the patient's condition requiring my urgent intervention.  I personally saw the patient and independently provided 34 minutes of non-concurrent critical care out of the total shared critical care time provided, excluding separately reportable procedures.       PAST MEDICAL HISTORY      has a past medical history of Asthma, Chronic pain, DDD (degenerative disc disease), lumbar, Diabetes mellitus (HCC), GERD (gastroesophageal reflux disease), Hyperlipidemia, Hypertension, Morbid obesity with BMI of 40.0-44.9, adult ([facility]) (5/20/2015), Osteoarthritis, and Vitamin D deficiency.     EMERGENCY DEPARTMENT COURSE and DIFFERENTIAL DIAGNOSIS/MDM:   Vitals:    Vitals:    05/21/24 1332 05/21/24 1700   BP: (!) 146/63 (!) 162/81   Pulse: 53 64   Resp: 18 18   Temp: 98.1 °F (36.7 °C) 97 °F (36.1 °C)   TempSrc: Oral Oral   SpO2: 95%    Weight: 128.7 kg (283 lb 11.7 oz)        Patient was given the following medications:  Medications   ceFEPIme (MAXIPIME) 2,000 mg in sodium chloride 0.9 % 100 mL IVPB (mini-bag) (2,000 mg IntraVENous New Bag 5/21/24 1603)   vancomycin (VANCOCIN) 2000 mg in 400 mL IVPB (has no administration in time range)   potassium chloride (KLOR-CON M) extended release tablet 40 mEq (has no administration in time range)   sodium chloride 0.9 % bolus 1,000 mL (has no administration in time range)   sodium chloride 0.9 % bolus 1,000 mL (1,000 mLs IntraVENous New Bag 5/21/24 1601)             Is this patient to be included in the SEP-1 Core Measure due to severe sepsis or septic shock?   No   Exclusion criteria - the patient is NOT to be included for SEP-1 Core Measure due to:  2+ SIRS criteria are not met    Chronic Conditions affecting care:    has a past medical history of Asthma, Chronic pain, DDD (degenerative disc disease), lumbar, Diabetes mellitus (HCC), GERD (gastroesophageal reflux disease), Hyperlipidemia, Hypertension, Morbid obesity with BMI of 40.0-44.9, adult ([facility]) (5/20/2015),  Osteoarthritis, and Vitamin D deficiency.    CONSULTS: (Who and What was discussed)  IP CONSULT TO PHARMACY  IP CONSULT TO PODIATRY      Social Determinants Significantly Affecting Health : None    Records Reviewed (External and Source) n/a    CC/HPI Summary, DDx, ED Course, and Reassessment: Patient presents for evaluation of wound to his left fifth digit of his foot.  On exam, he is resting comfortably in bed no acute distress and nontoxic.  Vitals are within normal limits.  He is afebrile.  Patient has a chronic open draining wound to the left fifth digit.  There is no some ascending erythema or warmth.  No streaking.  Also has callus overlying the plantar aspect of the lower first MTP.  He will be reevaluated.    Disposition Considerations (tests considered but not done, Admit vs D/C, Shared Decision Making, Pt Expectation of Test or Tx.): CBC and CMP are remarkable for mild hypokalemia and hyperglycemia.  No evidence diabetic ketoacidosis or leukocytosis.  Initial lactate 2.0 which could be due to metformin versus infection.  Has elevated inflammatory markers.  X-ray of the left foot is showing no evidence of osteomyelitis but I do believe he warrants admission for further evaluation and management of infected diabetic ulcer.  Given fluids and broad-spectrum antibiotic therapy.  Hospitalist will resume care of the patient at this time.  Patient was informed and agreeable.  He is stable for admission.      I am the Primary Clinician of Record.  FINAL IMPRESSION      1. Open wound of left foot with complication, initial encounter    2. Hyperglycemia          DISPOSITION/PLAN     DISPOSITION Admitted 05/21/2024 04:47:11 PM      PATIENT REFERRED TO:  No follow-up provider specified.    DISCHARGE MEDICATIONS:  New Prescriptions    No medications on file       DISCONTINUED MEDICATIONS:  Discontinued Medications    DICLOFENAC SODIUM 1 % CREA    Apply 2 g topically every 6 hours as needed (pain/swelling) To the left

## 2024-05-21 NOTE — PROGRESS NOTES
Medication Reconciliation    List of medications patient is currently taking is complete.     Source of information: 1. Conversation with patient at bedside                                       2. EPIC records      Allergies  Patient has no known allergies.     Notes regarding home medications:   1. Patient received all of his morning home medications prior to arrival to the emergency department.    Brayn Pratt RPH, PharmD, BCPS  5/21/2024 5:09 PM

## 2024-05-21 NOTE — PROGRESS NOTES
Clinical Pharmacy Note  Vancomycin Consult    Cristi Ortiz is a 64 y.o. male ordered vancomycin for SSTI --> Diabetic foot wound ; consult received from Dr. Braden to manage therapy. Also receiving Cefepime.    Allergies:  Patient has no known allergies.     Temp max:  Temp (24hrs), Av.7 °F (36.5 °C), Min:97 °F (36.1 °C), Max:98.1 °F (36.7 °C)      Recent Labs     24  1407   WBC 10.9       Recent Labs     24  1407   BUN 14   CREATININE 1.1       No intake or output data in the 24 hours ending 24 1905    Culture Results:      Ht Readings from Last 1 Encounters:   10/02/22 1.829 m (6')        Wt Readings from Last 1 Encounters:   24 128.7 kg (283 lb 11.7 oz)         CrCl cannot be calculated (Unknown ideal weight.).    Assessment/Plan:  Day # 1 of vancomycin.  2000 mg x 1 dose ordered for ED   Vancomycin 1000 mg IV every 12 hours.    Goal -600  Predicted  ( 24,ss)  trough 13.6     Level and SCr ordered for    Thank you for the consult.     Jany Keys [facility]

## 2024-05-22 LAB
ALBUMIN SERPL-MCNC: 3 G/DL (ref 3.4–5)
ALBUMIN/GLOB SERPL: 0.9 {RATIO} (ref 1.1–2.2)
ALP SERPL-CCNC: 115 U/L (ref 40–129)
ALT SERPL-CCNC: 10 U/L (ref 10–40)
ANION GAP SERPL CALCULATED.3IONS-SCNC: 10 MMOL/L (ref 3–16)
ANION GAP SERPL CALCULATED.3IONS-SCNC: 27 MMOL/L (ref 3–16)
AST SERPL-CCNC: 10 U/L (ref 15–37)
BASOPHILS # BLD: 0.1 K/UL (ref 0–0.2)
BASOPHILS NFR BLD: 0.8 %
BILIRUB SERPL-MCNC: 0.3 MG/DL (ref 0–1)
BUN SERPL-MCNC: 19 MG/DL (ref 7–20)
BUN SERPL-MCNC: 19 MG/DL (ref 7–20)
CALCIUM SERPL-MCNC: 8.5 MG/DL (ref 8.3–10.6)
CALCIUM SERPL-MCNC: 8.8 MG/DL (ref 8.3–10.6)
CHLORIDE SERPL-SCNC: 102 MMOL/L (ref 99–110)
CHLORIDE SERPL-SCNC: 103 MMOL/L (ref 99–110)
CO2 SERPL-SCNC: 25 MMOL/L (ref 21–32)
CO2 SERPL-SCNC: 25 MMOL/L (ref 21–32)
CREAT SERPL-MCNC: 1 MG/DL (ref 0.8–1.3)
CREAT SERPL-MCNC: 1.1 MG/DL (ref 0.8–1.3)
DEPRECATED RDW RBC AUTO: 15.7 % (ref 12.4–15.4)
EOSINOPHIL # BLD: 0.3 K/UL (ref 0–0.6)
EOSINOPHIL NFR BLD: 3 %
EST. AVERAGE GLUCOSE BLD GHB EST-MCNC: 312.1 MG/DL
GFR SERPLBLD CREATININE-BSD FMLA CKD-EPI: 74 ML/MIN/{1.73_M2}
GFR SERPLBLD CREATININE-BSD FMLA CKD-EPI: 83 ML/MIN/{1.73_M2}
GLUCOSE BLD-MCNC: 175 MG/DL (ref 70–99)
GLUCOSE BLD-MCNC: 203 MG/DL (ref 70–99)
GLUCOSE BLD-MCNC: 207 MG/DL (ref 70–99)
GLUCOSE BLD-MCNC: 229 MG/DL (ref 70–99)
GLUCOSE SERPL-MCNC: 184 MG/DL (ref 70–99)
GLUCOSE SERPL-MCNC: 186 MG/DL (ref 70–99)
HBA1C MFR BLD: 12.5 %
HCT VFR BLD AUTO: 41 % (ref 40.5–52.5)
HGB BLD-MCNC: 13.9 G/DL (ref 13.5–17.5)
LYMPHOCYTES # BLD: 2.5 K/UL (ref 1–5.1)
LYMPHOCYTES NFR BLD: 22.6 %
MAGNESIUM SERPL-MCNC: 1.9 MG/DL (ref 1.8–2.4)
MCH RBC QN AUTO: 27.5 PG (ref 26–34)
MCHC RBC AUTO-ENTMCNC: 33.8 G/DL (ref 31–36)
MCV RBC AUTO: 81.4 FL (ref 80–100)
MONOCYTES # BLD: 1 K/UL (ref 0–1.3)
MONOCYTES NFR BLD: 9.5 %
NEUTROPHILS # BLD: 7 K/UL (ref 1.7–7.7)
NEUTROPHILS NFR BLD: 64.1 %
PERFORMED ON: ABNORMAL
PLATELET # BLD AUTO: 188 K/UL (ref 135–450)
PMV BLD AUTO: 9.5 FL (ref 5–10.5)
POTASSIUM SERPL-SCNC: 3.3 MMOL/L (ref 3.5–5.1)
POTASSIUM SERPL-SCNC: 3.7 MMOL/L (ref 3.5–5.1)
PROCALCITONIN SERPL IA-MCNC: 0.05 NG/ML (ref 0–0.15)
PROT SERPL-MCNC: 6.3 G/DL (ref 6.4–8.2)
RBC # BLD AUTO: 5.04 M/UL (ref 4.2–5.9)
SODIUM SERPL-SCNC: 137 MMOL/L (ref 136–145)
SODIUM SERPL-SCNC: 155 MMOL/L (ref 136–145)
WBC # BLD AUTO: 11 K/UL (ref 4–11)

## 2024-05-22 PROCEDURE — 9990000010 HC NO CHARGE VISIT

## 2024-05-22 PROCEDURE — 6370000000 HC RX 637 (ALT 250 FOR IP)

## 2024-05-22 PROCEDURE — 80053 COMPREHEN METABOLIC PANEL: CPT

## 2024-05-22 PROCEDURE — 87205 SMEAR GRAM STAIN: CPT

## 2024-05-22 PROCEDURE — 6370000000 HC RX 637 (ALT 250 FOR IP): Performed by: INTERNAL MEDICINE

## 2024-05-22 PROCEDURE — 6370000000 HC RX 637 (ALT 250 FOR IP): Performed by: NURSE PRACTITIONER

## 2024-05-22 PROCEDURE — 6360000002 HC RX W HCPCS: Performed by: INTERNAL MEDICINE

## 2024-05-22 PROCEDURE — 94640 AIRWAY INHALATION TREATMENT: CPT

## 2024-05-22 PROCEDURE — 83735 ASSAY OF MAGNESIUM: CPT

## 2024-05-22 PROCEDURE — 87070 CULTURE OTHR SPECIMN AEROBIC: CPT

## 2024-05-22 PROCEDURE — 1200000000 HC SEMI PRIVATE

## 2024-05-22 PROCEDURE — 84145 PROCALCITONIN (PCT): CPT

## 2024-05-22 PROCEDURE — 87077 CULTURE AEROBIC IDENTIFY: CPT

## 2024-05-22 PROCEDURE — 85025 COMPLETE CBC W/AUTO DIFF WBC: CPT

## 2024-05-22 PROCEDURE — 36415 COLL VENOUS BLD VENIPUNCTURE: CPT

## 2024-05-22 PROCEDURE — 2580000003 HC RX 258: Performed by: INTERNAL MEDICINE

## 2024-05-22 PROCEDURE — 94760 N-INVAS EAR/PLS OXIMETRY 1: CPT

## 2024-05-22 RX ORDER — INSULIN LISPRO 100 [IU]/ML
5 INJECTION, SOLUTION INTRAVENOUS; SUBCUTANEOUS
Status: DISCONTINUED | OUTPATIENT
Start: 2024-05-22 | End: 2024-05-24 | Stop reason: HOSPADM

## 2024-05-22 RX ORDER — ACYCLOVIR 400 MG/1
1 TABLET ORAL
Qty: 3 EACH | Refills: 3 | Status: SHIPPED | OUTPATIENT
Start: 2024-05-22

## 2024-05-22 RX ORDER — LANOLIN ALCOHOL/MO/W.PET/CERES
9 CREAM (GRAM) TOPICAL NIGHTLY PRN
Status: DISCONTINUED | OUTPATIENT
Start: 2024-05-22 | End: 2024-05-24 | Stop reason: HOSPADM

## 2024-05-22 RX ADMIN — FLUTICASONE PROPIONATE 1 SPRAY: 50 SPRAY, METERED NASAL at 08:18

## 2024-05-22 RX ADMIN — INSULIN LISPRO 5 UNITS: 100 INJECTION, SOLUTION INTRAVENOUS; SUBCUTANEOUS at 16:55

## 2024-05-22 RX ADMIN — MOMETASONE FUROATE AND FORMOTEROL FUMARATE DIHYDRATE 2 PUFF: 200; 5 AEROSOL RESPIRATORY (INHALATION) at 19:57

## 2024-05-22 RX ADMIN — HYDROCHLOROTHIAZIDE 25 MG: 25 TABLET ORAL at 08:18

## 2024-05-22 RX ADMIN — INSULIN LISPRO 1 UNITS: 100 INJECTION, SOLUTION INTRAVENOUS; SUBCUTANEOUS at 16:55

## 2024-05-22 RX ADMIN — OXYCODONE HYDROCHLORIDE AND ACETAMINOPHEN 1 TABLET: 5; 325 TABLET ORAL at 17:21

## 2024-05-22 RX ADMIN — ATORVASTATIN CALCIUM 10 MG: 10 TABLET, FILM COATED ORAL at 08:18

## 2024-05-22 RX ADMIN — TIOTROPIUM BROMIDE INHALATION SPRAY 2 PUFF: 3.12 SPRAY, METERED RESPIRATORY (INHALATION) at 08:26

## 2024-05-22 RX ADMIN — Medication 9 MG: at 00:18

## 2024-05-22 RX ADMIN — POTASSIUM CHLORIDE 40 MEQ: 1500 TABLET, EXTENDED RELEASE ORAL at 05:22

## 2024-05-22 RX ADMIN — CEFEPIME 2000 MG: 2 INJECTION, POWDER, FOR SOLUTION INTRAVENOUS at 05:17

## 2024-05-22 RX ADMIN — Medication 9 MG: at 22:59

## 2024-05-22 RX ADMIN — ATENOLOL 100 MG: 50 TABLET ORAL at 08:18

## 2024-05-22 RX ADMIN — OXYCODONE HYDROCHLORIDE AND ACETAMINOPHEN 1 TABLET: 5; 325 TABLET ORAL at 08:21

## 2024-05-22 RX ADMIN — PANTOPRAZOLE SODIUM 40 MG: 40 TABLET, DELAYED RELEASE ORAL at 05:13

## 2024-05-22 RX ADMIN — ASPIRIN 81 MG: 81 TABLET, COATED ORAL at 08:18

## 2024-05-22 RX ADMIN — CEFEPIME 2000 MG: 2 INJECTION, POWDER, FOR SOLUTION INTRAVENOUS at 15:40

## 2024-05-22 RX ADMIN — AMITRIPTYLINE HYDROCHLORIDE 10 MG: 10 TABLET, FILM COATED ORAL at 08:18

## 2024-05-22 RX ADMIN — VANCOMYCIN HYDROCHLORIDE 1000 MG: 1 INJECTION, POWDER, LYOPHILIZED, FOR SOLUTION INTRAVENOUS at 22:58

## 2024-05-22 RX ADMIN — VANCOMYCIN HYDROCHLORIDE 1000 MG: 1 INJECTION, POWDER, LYOPHILIZED, FOR SOLUTION INTRAVENOUS at 10:23

## 2024-05-22 RX ADMIN — MOMETASONE FUROATE AND FORMOTEROL FUMARATE DIHYDRATE 2 PUFF: 200; 5 AEROSOL RESPIRATORY (INHALATION) at 08:28

## 2024-05-22 ASSESSMENT — PAIN DESCRIPTION - ORIENTATION
ORIENTATION: LEFT
ORIENTATION: LEFT

## 2024-05-22 ASSESSMENT — PAIN DESCRIPTION - FREQUENCY: FREQUENCY: CONTINUOUS

## 2024-05-22 ASSESSMENT — PAIN DESCRIPTION - LOCATION
LOCATION: TOE (COMMENT WHICH ONE)
LOCATION: TOE (COMMENT WHICH ONE)

## 2024-05-22 ASSESSMENT — PAIN - FUNCTIONAL ASSESSMENT
PAIN_FUNCTIONAL_ASSESSMENT: ACTIVITIES ARE NOT PREVENTED
PAIN_FUNCTIONAL_ASSESSMENT: PREVENTS OR INTERFERES SOME ACTIVE ACTIVITIES AND ADLS

## 2024-05-22 ASSESSMENT — PAIN DESCRIPTION - DESCRIPTORS
DESCRIPTORS: THROBBING
DESCRIPTORS: ACHING

## 2024-05-22 ASSESSMENT — PAIN SCALES - GENERAL
PAINLEVEL_OUTOF10: 0
PAINLEVEL_OUTOF10: 0
PAINLEVEL_OUTOF10: 7
PAINLEVEL_OUTOF10: 0
PAINLEVEL_OUTOF10: 10

## 2024-05-22 NOTE — PLAN OF CARE
Problem: Discharge Planning  Goal: Discharge to home or other facility with appropriate resources  5/22/2024 0952 by Seema Gonzales RN  Outcome: Progressing  5/22/2024 0951 by Seema Gonzales RN  Outcome: Progressing  Flowsheets  Taken 5/22/2024 0815 by Seema Gonzales RN  Discharge to home or other facility with appropriate resources: Identify barriers to discharge with patient and caregiver  Taken 5/21/2024 2018 by Sofya Xie RN  Discharge to home or other facility with appropriate resources: Identify barriers to discharge with patient and caregiver  5/22/2024 0951 by Seema Gonzales RN  Outcome: Progressing  Flowsheets  Taken 5/22/2024 0815 by Seema Gonzales RN  Discharge to home or other facility with appropriate resources: Identify barriers to discharge with patient and caregiver  Taken 5/21/2024 2018 by Sofya Xie RN  Discharge to home or other facility with appropriate resources: Identify barriers to discharge with patient and caregiver     Problem: Pain  Goal: Verbalizes/displays adequate comfort level or baseline comfort level  5/22/2024 0952 by Seema Gonzales RN  Outcome: Progressing  5/22/2024 0951 by Seema Gonzales RN  Outcome: Progressing  5/22/2024 0951 by Seema Gonzales RN  Outcome: Progressing     Problem: Skin/Tissue Integrity - Adult  Goal: Skin integrity remains intact  5/22/2024 0952 by Seema Gonzales RN  Outcome: Progressing  5/22/2024 0951 by Seema Gonzales RN  Outcome: Progressing  Flowsheets (Taken 5/22/2024 0815)  Skin Integrity Remains Intact: Monitor for areas of redness and/or skin breakdown  5/22/2024 0951 by Seema Gonzales RN  Outcome: Progressing  Flowsheets (Taken 5/22/2024 0815)  Skin Integrity Remains Intact: Monitor for areas of redness and/or skin breakdown  5/21/2024 2333 by Sofya Xie RN  Outcome: Progressing  Flowsheets (Taken 5/21/2024 2018)  Skin Integrity Remains Intact: Monitor for areas of redness and/or skin breakdown  Goal:  patient weight   Monitor urine specific gravity, serum osmolarity and serum sodium as indicated or ordered   Monitor response to interventions for patient's volume status, including labs, urine output, blood pressure (other measures as available)   Encourage oral intake as appropriate  Goal: Glucose maintained within prescribed range  5/22/2024 0952 by Seema Gonzales, RN  Outcome: Progressing  5/22/2024 0951 by Seema Gonzales, RN  Outcome: Progressing  Flowsheets (Taken 5/22/2024 0815)  Glucose maintained within prescribed range: Monitor blood glucose as ordered  5/22/2024 0951 by Seema Gonzales RN  Outcome: Progressing  Flowsheets (Taken 5/22/2024 0815)  Glucose maintained within prescribed range: Monitor blood glucose as ordered  5/21/2024 2333 by Sofya Xie RN  Outcome: Progressing  Flowsheets (Taken 5/21/2024 2018)  Glucose maintained within prescribed range:   Monitor blood glucose as ordered   Assess for signs and symptoms of hyperglycemia and hypoglycemia   Administer ordered medications to maintain glucose within target range     Problem: Safety - Adult  Goal: Free from fall injury  5/22/2024 0952 by Seema Gonzales, RN  Outcome: Progressing  5/22/2024 0951 by Seema Gonzales, RN  Outcome: Progressing  5/22/2024 0951 by Seema Gonzales RN  Outcome: Progressing

## 2024-05-22 NOTE — PROGRESS NOTES
mEq Oral Once    amitriptyline  10 mg Oral Daily    atenolol  100 mg Oral Daily    fluticasone  1 spray Each Nostril Daily    pantoprazole  40 mg Oral QAM AC    mometasone-formoterol  2 puff Inhalation BID RT    tiotropium  2 puff Inhalation Daily RT    vancomycin  1,000 mg IntraVENous Q12H    vancomycin (VANCOCIN) intermittent dosing (placeholder)   Other RX Placeholder    insulin glargine (1 unit dial)  66 Units SubCUTAneous QAM       Objective        Patient Active Problem List   Diagnosis Code    Chest pain R07.9    Diabetes mellitus (HCC) E11.9    CAP (community acquired pneumonia) J18.9    HTN (hypertension) I10    Conjunctivitis H10.9    GERD (gastroesophageal reflux disease) K21.9    Tinea B35.9    Bunion, left foot M21.612    Neuroma of foot D36.13    Hyperlipidemia E78.5    Asthma J45.909    Osteoarthritis M19.90    Chronic pain G89.29    Vitamin D deficiency E55.9    S/P total knee arthroplasty Z96.659    Morbid obesity with BMI of 40.0-44.9, adult (HCC) E66.01, Z68.41    Diabetic foot infection ([facility]) E11.628, L08.9        BP (!) 143/82   Pulse 59   Temp 98.2 °F (36.8 °C) (Oral)   Resp 16   Ht 1.829 m (6')   Wt 129 kg (284 lb 6.3 oz)   SpO2 95%   BMI 38.57 kg/m²     HgBA1c:    Lab Results   Component Value Date/Time    LABA1C 12.5 05/21/2024 02:07 PM       Recent Labs     05/21/24 2022 05/22/24  0726   POCGLU 329* 175*       BUN/Creatinine:    Lab Results   Component Value Date/Time    BUN 19 05/22/2024 07:44 AM    CREATININE 1.0 05/22/2024 07:44 AM       Assessment        Diabetes Management and Education    Does the patient have a Primary Care Physician? Yes, Kendall Varela MD       Does the patient require new medication instruction? TBD - suspect he will need to add prandial Humalog.    Reviewed basal and prandial insulin concepts.        Person responsible for administration of Insulin/Medication:        [x] Self     [] Caregiver       [] Spouse       [] Other Family Member   []   Other    Level of patient/caregiver understanding able to:       [] Verbalized Understanding   [] Demonstrated Understanding       [] Teach Back       [x] Needs Reinforcement     []  Other:        Does the patient/caregiver monitor Blood Glucoses? Yes - fingerstick testing 1 - 3 times per day.    Recommend increased testing or CGM.    Reviewed glycemic control targets, testing frequency and when to call PCP.     Level of patient/caregiver understanding able to:        [] Verbalized Understanding   [] Demonstrated Understanding       [] Teach Back       [x] Needs Reinforcement     []  Other:        Does the patient/caregiver follow a Meal Plan? Yes - declines review       Does the patient/caregiver understand S/S of Hypoglycemia?  Yes  Reviewed symptoms, prevention and treatment.     Level of patient/caregiver understanding able to:       [] Verbalized Understanding   [] Demonstrated Understanding       [] Teach Back       [x] Needs Reinforcement     []  Other:                    Does the patient/caregiver understand S/S of Hyperglycemia?  No:     Reviewed symptoms, prevention and treatment.    Level of patient/caregiver understanding able to:         [] Verbalized Understanding   [] Demonstrated Understanding       [] Teach Back       [x] Needs Reinforcement     []  Other:           Plan        Ongoing diabetes education and blood glucose monitoring.     Recommend BG targets 140 - 180.  Suspect will need to add prandial Humalog.       [facility]      Recommend exploring CGM benefit options.                                       Teaching Time Diabetes Education:  30 minutes     Electronically signed by DAVID Miller on 5/22/2024 at 10:33 AM

## 2024-05-22 NOTE — PROGRESS NOTES
Phyiscal Therapy Attempt/Discharge  Cristi Ortiz    PT order noted. Per RN, pt is independent in the room and walking laps around the unit. PT will sign off. Please re-consult if there is a change in status.    Electronically signed by Bentley Zamorano PT on 5/22/2024 at 8:34 AM

## 2024-05-22 NOTE — PROGRESS NOTES
Blood sugar 229. Patient refusing insulin overnight and today. This RN educated patient on use of insulin with meals to control blood sugar. Patient agreeable to ordered insulin at this time. Electronically signed by Seema Gonzales RN on 5/22/2024 at 5:18 PM

## 2024-05-22 NOTE — PROGRESS NOTES
Patient did not receive potassium replacement in the ED. Patient potassium 3.3. Potassium replacement protocol followed per order. Patient tolerated well. Patient needs met at this time.     Electronically signed by Sofya Xie RN on 5/22/2024 at 5:34 AM

## 2024-05-22 NOTE — PLAN OF CARE
Problem: Discharge Planning  Goal: Discharge to home or other facility with appropriate resources  5/22/2024 0951 by Seema Gonzales RN  Outcome: Progressing  Flowsheets  Taken 5/22/2024 0815 by Seema Gonzales RN  Discharge to home or other facility with appropriate resources: Identify barriers to discharge with patient and caregiver  Taken 5/21/2024 2018 by Sofya Xie RN  Discharge to home or other facility with appropriate resources: Identify barriers to discharge with patient and caregiver  5/22/2024 0951 by Seema Gonzales RN  Outcome: Progressing  Flowsheets  Taken 5/22/2024 0815 by Seema Gonzales RN  Discharge to home or other facility with appropriate resources: Identify barriers to discharge with patient and caregiver  Taken 5/21/2024 2018 by Sofya Xie RN  Discharge to home or other facility with appropriate resources: Identify barriers to discharge with patient and caregiver     Problem: Pain  Goal: Verbalizes/displays adequate comfort level or baseline comfort level  5/22/2024 0951 by Seema Gonzales RN  Outcome: Progressing  5/22/2024 0951 by Seema Gonzales RN  Outcome: Progressing     Problem: Skin/Tissue Integrity - Adult  Goal: Skin integrity remains intact  5/22/2024 0951 by Seema Gonzales RN  Outcome: Progressing  Flowsheets (Taken 5/22/2024 0815)  Skin Integrity Remains Intact: Monitor for areas of redness and/or skin breakdown  5/22/2024 0951 by Seema Gonzales RN  Outcome: Progressing  Flowsheets (Taken 5/22/2024 0815)  Skin Integrity Remains Intact: Monitor for areas of redness and/or skin breakdown  5/21/2024 2333 by Sofya Xie RN  Outcome: Progressing  Flowsheets (Taken 5/21/2024 2018)  Skin Integrity Remains Intact: Monitor for areas of redness and/or skin breakdown  Goal: Incisions, wounds, or drain sites healing without S/S of infection  5/22/2024 0951 by Seema Gonzales RN  Outcome: Progressing  Flowsheets (Taken 5/22/2024 0815)  Incisions, Wounds, or  5/22/2024 0815)  Glucose maintained within prescribed range: Monitor blood glucose as ordered  5/22/2024 0951 by Seema Gonzales, RN  Outcome: Progressing  Flowsheets (Taken 5/22/2024 0815)  Glucose maintained within prescribed range: Monitor blood glucose as ordered  5/21/2024 2333 by Sofya Xie, RN  Outcome: Progressing  Flowsheets (Taken 5/21/2024 2018)  Glucose maintained within prescribed range:   Monitor blood glucose as ordered   Assess for signs and symptoms of hyperglycemia and hypoglycemia   Administer ordered medications to maintain glucose within target range     Problem: Safety - Adult  Goal: Free from fall injury  5/22/2024 0951 by Seema Gonzales, RN  Outcome: Progressing  5/22/2024 0951 by Seema Gonzales, RN  Outcome: Progressing

## 2024-05-22 NOTE — PROGRESS NOTES
Occupational Therapy Attempt/Discharge  Cristi Ortiz    OT order noted. Per RN, pt is independent in the room and walking laps around the unit. OT will sign off. Please re-consult if there is a change in status.    Electronically signed by Marley Oviedo OT on 5/22/24 at 8:26 AM EDT

## 2024-05-22 NOTE — PROGRESS NOTES
Patient alert and oriented x4. Patient up with steady gait, walked the halls, tolerated well. Patient refused tele monitor, education provided. Patient has home insulin, pharmacy aware and label applied. Patient states taking insulin in the morning only, education provided on glucose management throughout the day to help aid in tissue healing. Patient states understanding. Patient refused nightly medications with the exception of aspirin 81 mg. IV antibiotics started, skin assessment completed, and admission question completed. Patient denies chest pain, sob, and n/v. All needed items within reach.

## 2024-05-22 NOTE — PROGRESS NOTES
4 Eyes Skin Assessment     NAME:  Cristi Ortiz  YOB: 1960  MEDICAL RECORD NUMBER:  [id number]    The patient is being assessed for  Admission    I agree that at least one RN has performed a thorough Head to Toe Skin Assessment on the patient. ALL assessment sites listed below have been assessed.      Areas assessed by both nurses:    Head, Face, Ears, Shoulders, Back, Chest, Arms, Elbows, Hands, Sacrum. Buttock, Coccyx, Ischium, Legs. Feet and Heels, and Under Medical Devices         Does the Patient have a Wound? Yes wound(s) were present on assessment. LDA wound assessment was Initiated and completed by RN       Ramesh Prevention initiated by RN: No  Wound Care Orders initiated by RN: Yes    Pressure Injury (Stage 3,4, Unstageable, DTI, NWPT, and Complex wounds) if present, place Wound referral order by RN under : No    New Ostomies, if present place, Ostomy referral order under : No     Nurse 1 eSignature: Electronically signed by Sofya Xie RN on 5/22/24 at 12:10 AM EDT    **SHARE this note so that the co-signing nurse can place an eSignature**    Nurse 2 eSignature: Electronically signed by Liana Cortez RN on 5/22/24 at 12:58 AM EDT

## 2024-05-22 NOTE — PROGRESS NOTES
Clinical Pharmacy Note  Vancomycin Consult    Cristi Ortiz is a 64 y.o. male ordered vancomycin for SSTI --> Diabetic foot wound ; consult received from Dr. Braden to manage therapy. Also receiving Cefepime.    Allergies:  Patient has no known allergies.     Temp max:  Temp (24hrs), Av.8 °F (36.6 °C), Min:97 °F (36.1 °C), Max:98.2 °F (36.8 °C)      Recent Labs     24  1407 24  0605   WBC 10.9 11.0         Recent Labs     24  1407 24  0605 24  0744   BUN 14 19 19   CREATININE 1.1 1.1 1.0           Intake/Output Summary (Last 24 hours) at 2024 0933  Last data filed at 2024 0921  Gross per 24 hour   Intake 125 ml   Output 550 ml   Net -425 ml       Culture Results:      Ht Readings from Last 1 Encounters:   24 1.829 m (6')        Wt Readings from Last 1 Encounters:   24 129 kg (284 lb 6.3 oz)         Estimated Creatinine Clearance: 102 mL/min (based on SCr of 1 mg/dL).    Assessment/Plan:  Day # 2 of vancomycin.  2000 mg x 1 dose given last night at 2200   Vancomycin 1000 mg IV every 12 hours going forward   Goal -600  Predicted  ( 24,ss)  trough 13.6     SCr 1.0  Vanc level ordered for am     Thank you for the consult.     Jany Keys [facility]

## 2024-05-22 NOTE — PLAN OF CARE
Problem: Skin/Tissue Integrity - Adult  Goal: Skin integrity remains intact  Outcome: Progressing     Problem: Skin/Tissue Integrity - Adult  Goal: Incisions, wounds, or drain sites healing without S/S of infection  Outcome: Progressing     Problem: Infection - Adult  Goal: Absence of infection at discharge  Outcome: Progressing     Problem: Metabolic/Fluid and Electrolytes - Adult  Goal: Electrolytes maintained within normal limits  Outcome: Progressing     Problem: Metabolic/Fluid and Electrolytes - Adult  Goal: Hemodynamic stability and optimal renal function maintained  Outcome: Progressing     Problem: Metabolic/Fluid and Electrolytes - Adult  Goal: Glucose maintained within prescribed range  Outcome: Progressing     Problem: Discharge Planning  Goal: Discharge to home or other facility with appropriate resources  Recent Flowsheet Documentation  Taken 5/21/2024 2018 by Sofya Xie, RN  Discharge to home or other facility with appropriate resources: Identify barriers to discharge with patient and caregiver

## 2024-05-22 NOTE — PROGRESS NOTES
Hospitalist Progress Note      PCP: Kendall Varela MD    Date of Admission: 5/21/2024    Chief Complaint: Left foot ulcer    Hospital Course: 64-year-old male with past medical history of type 2 diabetes, essential hypertension, osteoarthritis who presents to the ED for worsening left foot ulcer.  Patient stated he had his foot on the corner of the bed and have worsening swelling and pain with drainage since.  He denies any fevers or chills at home.  While in ED he had a mildly elevated lactic acid along with an elevated CRP and ESR.  Left foot x-ray showed no signs of osteomyelitis.  He was afebrile with no leukocytosis.  He was started empirically on Vanco and cefepime and wound cultures were obtained.  Podiatry is consulted.    Subjective: Patient seen sitting in chair, states he is doing well.  States his foot already looks better than I did yesterday, now with just some tough keratinized skin at the base of his left pinky toe.  Denies any fevers or chills outpatient.    Assessment/Plan:  Diabetic foot infection  -Concern for diabetic foot infection on admission.  Area now does not appear infected  -CRP and ESR elevated, afebrile with no leukocytosis.  Pro-Shailesh disorder negative.  Left foot x-ray with no evidence of osteo  -Wound culture thus far with no WBCs or organisms  -Start empiric Vanco/cefepime, continue  -Podiatry eval, appreciate recommendations    Type 2 diabetes mellitus with hyperglycemia  -Update A1c is 12.5.  Diabetes educator consulted  -Home dose is Tresiba 60 units nightly, continue.  Patient states he takes 10 units of lispro with meals but unable to verify on med rec.  Start lispro 5 units with meals  -Continue to trend glucose, uptitrate as necessary  -Hold home glipizide while inpatient    Hypokalemia  -Resolved with replacement.  Monitor daily with as needed replacement    Essential hypertension  -Resume home atenolol amd HCTZ    Hyperlipidemia  -Resume home

## 2024-05-23 ENCOUNTER — TELEPHONE (OUTPATIENT)
Dept: PHARMACY | Age: 64
End: 2024-05-23

## 2024-05-23 LAB
ANION GAP SERPL CALCULATED.3IONS-SCNC: 8 MMOL/L (ref 3–16)
BASOPHILS # BLD: 0.1 K/UL (ref 0–0.2)
BASOPHILS NFR BLD: 0.9 %
BUN SERPL-MCNC: 18 MG/DL (ref 7–20)
CALCIUM SERPL-MCNC: 8.7 MG/DL (ref 8.3–10.6)
CHLORIDE SERPL-SCNC: 102 MMOL/L (ref 99–110)
CO2 SERPL-SCNC: 25 MMOL/L (ref 21–32)
CREAT SERPL-MCNC: 1.1 MG/DL (ref 0.8–1.3)
CREAT SERPL-MCNC: 1.1 MG/DL (ref 0.8–1.3)
DEPRECATED RDW RBC AUTO: 15.8 % (ref 12.4–15.4)
EOSINOPHIL # BLD: 0.3 K/UL (ref 0–0.6)
EOSINOPHIL NFR BLD: 2.8 %
GFR SERPLBLD CREATININE-BSD FMLA CKD-EPI: 74 ML/MIN/{1.73_M2}
GFR SERPLBLD CREATININE-BSD FMLA CKD-EPI: 74 ML/MIN/{1.73_M2}
GLUCOSE BLD-MCNC: 155 MG/DL (ref 70–99)
GLUCOSE BLD-MCNC: 157 MG/DL (ref 70–99)
GLUCOSE BLD-MCNC: 201 MG/DL (ref 70–99)
GLUCOSE BLD-MCNC: 209 MG/DL (ref 70–99)
GLUCOSE SERPL-MCNC: 158 MG/DL (ref 70–99)
HCT VFR BLD AUTO: 43.4 % (ref 40.5–52.5)
HGB BLD-MCNC: 14.7 G/DL (ref 13.5–17.5)
LYMPHOCYTES # BLD: 1.9 K/UL (ref 1–5.1)
LYMPHOCYTES NFR BLD: 19.6 %
MCH RBC QN AUTO: 27.8 PG (ref 26–34)
MCHC RBC AUTO-ENTMCNC: 33.9 G/DL (ref 31–36)
MCV RBC AUTO: 81.9 FL (ref 80–100)
MONOCYTES # BLD: 0.7 K/UL (ref 0–1.3)
MONOCYTES NFR BLD: 6.8 %
NEUTROPHILS # BLD: 6.8 K/UL (ref 1.7–7.7)
NEUTROPHILS NFR BLD: 69.9 %
PERFORMED ON: ABNORMAL
PLATELET # BLD AUTO: 193 K/UL (ref 135–450)
PMV BLD AUTO: 9.3 FL (ref 5–10.5)
POTASSIUM SERPL-SCNC: 3.7 MMOL/L (ref 3.5–5.1)
RBC # BLD AUTO: 5.3 M/UL (ref 4.2–5.9)
SODIUM SERPL-SCNC: 135 MMOL/L (ref 136–145)
VANCOMYCIN SERPL-MCNC: 16.2 UG/ML
WBC # BLD AUTO: 9.8 K/UL (ref 4–11)

## 2024-05-23 PROCEDURE — 94760 N-INVAS EAR/PLS OXIMETRY 1: CPT

## 2024-05-23 PROCEDURE — 36415 COLL VENOUS BLD VENIPUNCTURE: CPT

## 2024-05-23 PROCEDURE — 80048 BASIC METABOLIC PNL TOTAL CA: CPT

## 2024-05-23 PROCEDURE — 6370000000 HC RX 637 (ALT 250 FOR IP)

## 2024-05-23 PROCEDURE — 85025 COMPLETE CBC W/AUTO DIFF WBC: CPT

## 2024-05-23 PROCEDURE — 87641 MR-STAPH DNA AMP PROBE: CPT

## 2024-05-23 PROCEDURE — 94640 AIRWAY INHALATION TREATMENT: CPT

## 2024-05-23 PROCEDURE — 6360000002 HC RX W HCPCS

## 2024-05-23 PROCEDURE — 1200000000 HC SEMI PRIVATE

## 2024-05-23 PROCEDURE — 6370000000 HC RX 637 (ALT 250 FOR IP): Performed by: NURSE PRACTITIONER

## 2024-05-23 PROCEDURE — 82565 ASSAY OF CREATININE: CPT

## 2024-05-23 PROCEDURE — 6360000002 HC RX W HCPCS: Performed by: INTERNAL MEDICINE

## 2024-05-23 PROCEDURE — 0JBR0ZZ EXCISION OF LEFT FOOT SUBCUTANEOUS TISSUE AND FASCIA, OPEN APPROACH: ICD-10-PCS

## 2024-05-23 PROCEDURE — 80202 ASSAY OF VANCOMYCIN: CPT

## 2024-05-23 PROCEDURE — 6370000000 HC RX 637 (ALT 250 FOR IP): Performed by: INTERNAL MEDICINE

## 2024-05-23 PROCEDURE — 2580000003 HC RX 258

## 2024-05-23 PROCEDURE — 2580000003 HC RX 258: Performed by: INTERNAL MEDICINE

## 2024-05-23 RX ADMIN — VANCOMYCIN HYDROCHLORIDE 750 MG: 750 INJECTION, POWDER, LYOPHILIZED, FOR SOLUTION INTRAVENOUS at 23:10

## 2024-05-23 RX ADMIN — PANTOPRAZOLE SODIUM 40 MG: 40 TABLET, DELAYED RELEASE ORAL at 05:54

## 2024-05-23 RX ADMIN — AMITRIPTYLINE HYDROCHLORIDE 10 MG: 10 TABLET, FILM COATED ORAL at 09:30

## 2024-05-23 RX ADMIN — OXYCODONE HYDROCHLORIDE AND ACETAMINOPHEN 1 TABLET: 5; 325 TABLET ORAL at 22:58

## 2024-05-23 RX ADMIN — CEFEPIME 2000 MG: 2 INJECTION, POWDER, FOR SOLUTION INTRAVENOUS at 16:53

## 2024-05-23 RX ADMIN — INSULIN LISPRO 5 UNITS: 100 INJECTION, SOLUTION INTRAVENOUS; SUBCUTANEOUS at 09:37

## 2024-05-23 RX ADMIN — MOMETASONE FUROATE AND FORMOTEROL FUMARATE DIHYDRATE 2 PUFF: 200; 5 AEROSOL RESPIRATORY (INHALATION) at 10:19

## 2024-05-23 RX ADMIN — ENOXAPARIN SODIUM 30 MG: 100 INJECTION SUBCUTANEOUS at 22:57

## 2024-05-23 RX ADMIN — INSULIN LISPRO 1 UNITS: 100 INJECTION, SOLUTION INTRAVENOUS; SUBCUTANEOUS at 16:54

## 2024-05-23 RX ADMIN — ASPIRIN 81 MG: 81 TABLET, COATED ORAL at 09:30

## 2024-05-23 RX ADMIN — HYDROCHLOROTHIAZIDE 25 MG: 25 TABLET ORAL at 09:30

## 2024-05-23 RX ADMIN — Medication 9 MG: at 22:58

## 2024-05-23 RX ADMIN — TIOTROPIUM BROMIDE INHALATION SPRAY 2 PUFF: 3.12 SPRAY, METERED RESPIRATORY (INHALATION) at 10:19

## 2024-05-23 RX ADMIN — FLUTICASONE PROPIONATE 1 SPRAY: 50 SPRAY, METERED NASAL at 09:37

## 2024-05-23 RX ADMIN — INSULIN LISPRO 5 UNITS: 100 INJECTION, SOLUTION INTRAVENOUS; SUBCUTANEOUS at 16:54

## 2024-05-23 RX ADMIN — CEFEPIME 2000 MG: 2 INJECTION, POWDER, FOR SOLUTION INTRAVENOUS at 05:58

## 2024-05-23 RX ADMIN — MOMETASONE FUROATE AND FORMOTEROL FUMARATE DIHYDRATE 2 PUFF: 200; 5 AEROSOL RESPIRATORY (INHALATION) at 19:54

## 2024-05-23 RX ADMIN — VANCOMYCIN HYDROCHLORIDE 1000 MG: 1 INJECTION, POWDER, LYOPHILIZED, FOR SOLUTION INTRAVENOUS at 10:55

## 2024-05-23 RX ADMIN — ATORVASTATIN CALCIUM 10 MG: 10 TABLET, FILM COATED ORAL at 09:30

## 2024-05-23 RX ADMIN — SODIUM CHLORIDE, PRESERVATIVE FREE 10 ML: 5 INJECTION INTRAVENOUS at 23:00

## 2024-05-23 RX ADMIN — INSULIN LISPRO 5 UNITS: 100 INJECTION, SOLUTION INTRAVENOUS; SUBCUTANEOUS at 12:11

## 2024-05-23 ASSESSMENT — PAIN DESCRIPTION - FREQUENCY: FREQUENCY: CONTINUOUS

## 2024-05-23 ASSESSMENT — PAIN DESCRIPTION - DESCRIPTORS: DESCRIPTORS: ACHING

## 2024-05-23 ASSESSMENT — PAIN DESCRIPTION - LOCATION: LOCATION: LEG;FOOT

## 2024-05-23 ASSESSMENT — PAIN DESCRIPTION - ORIENTATION: ORIENTATION: RIGHT

## 2024-05-23 ASSESSMENT — PAIN SCALES - GENERAL: PAINLEVEL_OUTOF10: 10

## 2024-05-23 ASSESSMENT — PAIN - FUNCTIONAL ASSESSMENT: PAIN_FUNCTIONAL_ASSESSMENT: ACTIVITIES ARE NOT PREVENTED

## 2024-05-23 NOTE — PROGRESS NOTES
[facility]  Diabetes Education   Progress Note       NAME:  Cristi Ortiz  MEDICAL RECORD NUMBER:  [id number]  AGE: 64 y.o.   GENDER: male  : 1960  TODAY'S DATE:  2024    Subjective   Reason for Diabetic Education Evaluation and Assessment: hyperglycemia     Cristi is hoping for discharge today.      Visit Type: follow-up      Cristi Ortiz is a 64 y.o. male referred by:     [] Physician  [x] Nursing  [] Chart Review   [] Other:     PAST MEDICAL HISTORY        Diagnosis Date    Asthma     Chronic pain     DDD (degenerative disc disease), lumbar     Diabetes mellitus (HCC)     GERD (gastroesophageal reflux disease)     Hyperlipidemia     Hypertension     Morbid obesity with BMI of 40.0-44.9, adult (Formerly [facility]) 2015    Osteoarthritis     Vitamin D deficiency        PAST SURGICAL HISTORY    Past Surgical History:   Procedure Laterality Date    FOOT SURGERY Left 2013    SHAKA / AKIN BUNIONECTOMY LEFT FOOT, EXCISE NEUROMA LEFT    FOOT SURGERY Left     JOINT REPLACEMENT Right     knee    KNEE ARTHROSCOPY      X 6    NERVE BLOCK Left     2019    TOTAL KNEE ARTHROPLASTY Right 1/6/15       FAMILY HISTORY    Family History   Problem Relation Age of Onset    Heart Disease Father     Diabetes Mother     Heart Disease Mother        SOCIAL HISTORY    Social History     Tobacco Use    Smoking status: Never    Smokeless tobacco: Never   Vaping Use    Vaping Use: Never used   Substance Use Topics    Alcohol use: No    Drug use: No       ALLERGIES    No Known Allergies    MEDICATIONS     insulin lispro  5 Units SubCUTAneous TID WC    aspirin  81 mg Oral Daily    atorvastatin  10 mg Oral Daily    hydroCHLOROthiazide  25 mg Oral Daily    cefepime  2,000 mg IntraVENous Q12H    sodium chloride flush  5-40 mL IntraVENous 2 times per day    enoxaparin  30 mg SubCUTAneous BID    insulin lispro  0-4 Units SubCUTAneous TID WC    insulin lispro  0-4 Units SubCUTAneous Nightly    potassium chloride  40 mEq Oral

## 2024-05-23 NOTE — CONSULTS
Department of Podiatry Consult Note  Resident       Reason for Consult:  diab foot infection  Requesting Physician:  Dr. Meño Mujica MD    CHIEF COMPLAINT: Left foot wounds    HISTORY OF PRESENT ILLNESS:                The patient is a 64 y.o. male with significant past medical history as seen below who is consulted for diab foot infection .  Patient states that the wounds have been there for roughly 1 month and have been progressively getting more painful with increase in drainage specifically to the fifth toe on the left foot.  Patient states that he follows with a foot doctor in the outpatient setting that has been keeping his calluses trimmed down however only recently noticed an opening in the left fifth toe.  At which time he presented to the emergency department for evaluation.  In the emergency department patient had an increase in sed rate as well as CRP and had a lactic acid of 2.0 meeting the sepsis criteria at which time it is recommended that he be admitted for medical management and possible podiatric surgical intervention. Patient denies any other pedal complaints at this time. Denies F/C/N/V.      Past Medical History:        Diagnosis Date    Asthma     Chronic pain     DDD (degenerative disc disease), lumbar     Diabetes mellitus (HCC)     GERD (gastroesophageal reflux disease)     Hyperlipidemia     Hypertension     Morbid obesity with BMI of 40.0-44.9, adult (Formerly [facility]) 5/20/2015    Osteoarthritis     Vitamin D deficiency        Past Surgical History:        Procedure Laterality Date    FOOT SURGERY Left 4-    SHAKA / AKIN BUNIONECTOMY LEFT FOOT, EXCISE NEUROMA LEFT    FOOT SURGERY Left     JOINT REPLACEMENT Right     knee    KNEE ARTHROSCOPY      X 6    NERVE BLOCK Left     2019    TOTAL KNEE ARTHROPLASTY Right 1/6/15       Allergies:   Patient has no known allergies.    Medications:   Home Meds  No current facility-administered medications on file prior to encounter.     Current

## 2024-05-23 NOTE — PLAN OF CARE
Problem: Discharge Planning  Goal: Discharge to home or other facility with appropriate resources  5/22/2024 2137 by Ursula Li RN  Outcome: Progressing  5/22/2024 0952 by Seema Gonzales RN  Outcome: Progressing  5/22/2024 0951 by Seema Gonzales RN  Outcome: Progressing  Flowsheets  Taken 5/22/2024 0815 by Seema Gonzales RN  Discharge to home or other facility with appropriate resources: Identify barriers to discharge with patient and caregiver  Taken 5/21/2024 2018 by Sofya Xie RN  Discharge to home or other facility with appropriate resources: Identify barriers to discharge with patient and caregiver     Problem: Pain  Goal: Verbalizes/displays adequate comfort level or baseline comfort level  5/22/2024 2137 by Ursula Li RN  Outcome: Progressing  5/22/2024 0952 by Seema Gonzales RN  Outcome: Progressing  5/22/2024 0951 by Seema Gonzales RN  Outcome: Progressing     Problem: Skin/Tissue Integrity - Adult  Goal: Skin integrity remains intact  5/22/2024 2137 by Ursula Li RN  Outcome: Progressing  5/22/2024 0952 by Seema Gonzales RN  Outcome: Progressing  5/22/2024 0951 by Seema Gonzales RN  Outcome: Progressing  Flowsheets (Taken 5/22/2024 0815)  Skin Integrity Remains Intact: Monitor for areas of redness and/or skin breakdown  Goal: Incisions, wounds, or drain sites healing without S/S of infection  5/22/2024 2137 by Ursula Li RN  Outcome: Progressing  5/22/2024 0952 by Seema Gonzales RN  Outcome: Progressing  5/22/2024 0951 by Seema Gonzales RN  Outcome: Progressing  Flowsheets (Taken 5/22/2024 0815)  Incisions, Wounds, or Drain Sites Healing Without Sign and Symptoms of Infection: ADMISSION and DAILY: Assess and document risk factors for pressure ulcer development     Problem: Infection - Adult  Goal: Absence of infection at discharge  5/22/2024 2137 by Ursula Li RN  Outcome: Progressing  5/22/2024 0952 by Seema Gonzales RN  Outcome: Progressing  5/22/2024  0951 by Christian, Seema, RN  Outcome: Progressing  Flowsheets (Taken 5/22/2024 0815)  Absence of infection at discharge: Assess and monitor for signs and symptoms of infection     Problem: Metabolic/Fluid and Electrolytes - Adult  Goal: Electrolytes maintained within normal limits  5/22/2024 2137 by Ursula Li RN  Outcome: Progressing  5/22/2024 0952 by Seema Gonzales RN  Outcome: Progressing  5/22/2024 0951 by Seema Gonzales RN  Outcome: Progressing  Flowsheets (Taken 5/22/2024 0815)  Electrolytes maintained within normal limits: Monitor labs and assess patient for signs and symptoms of electrolyte imbalances  Goal: Hemodynamic stability and optimal renal function maintained  5/22/2024 0952 by Seema Gonzales RN  Outcome: Progressing  5/22/2024 0951 by Seema Gonzales RN  Outcome: Progressing  Flowsheets (Taken 5/22/2024 0815)  Hemodynamic stability and optimal renal function maintained: Monitor labs and assess for signs and symptoms of volume excess or deficit  Goal: Glucose maintained within prescribed range  5/22/2024 0952 by Seema Gonzales RN  Outcome: Progressing  5/22/2024 0951 by Seema Gonzales RN  Outcome: Progressing  Flowsheets (Taken 5/22/2024 0815)  Glucose maintained within prescribed range: Monitor blood glucose as ordered     Problem: Safety - Adult  Goal: Free from fall injury  5/22/2024 0952 by Seema Gonzales RN  Outcome: Progressing  5/22/2024 0951 by Seema Gonzales RN  Outcome: Progressing

## 2024-05-23 NOTE — TELEPHONE ENCOUNTER
New Diabetes referral from the hospitalist.  Recommended by the Diabetes Educator.    Reached out to schedule initial appointment.     Spoke with patient. Scheduled for 5/29/24    Chrissy Henderson PharmD  [facility]   [facility]  Diabetes Service  [phone]    For Pharmacy Admin Tracking Only    Program: Medication Management  CPA in place:  Yes  Recommendation Provided To:   Intervention Detail:   Intervention Accepted By:   Gap Closed?:    Time Spent (min): 5

## 2024-05-23 NOTE — PLAN OF CARE
Problem: Discharge Planning  Goal: Discharge to home or other facility with appropriate resources  Outcome: Progressing     Problem: Pain  Goal: Verbalizes/displays adequate comfort level or baseline comfort level  Outcome: Progressing     Problem: Skin/Tissue Integrity - Adult  Goal: Skin integrity remains intact  Outcome: Progressing     Problem: Skin/Tissue Integrity - Adult  Goal: Incisions, wounds, or drain sites healing without S/S of infection  Outcome: Progressing     Problem: Infection - Adult  Goal: Absence of infection at discharge  Outcome: Progressing     Problem: Metabolic/Fluid and Electrolytes - Adult  Goal: Electrolytes maintained within normal limits  Outcome: Progressing     Problem: Metabolic/Fluid and Electrolytes - Adult  Goal: Hemodynamic stability and optimal renal function maintained  Outcome: Progressing     Problem: Metabolic/Fluid and Electrolytes - Adult  Goal: Glucose maintained within prescribed range  Outcome: Progressing     Problem: Safety - Adult  Goal: Free from fall injury  Outcome: Progressing

## 2024-05-23 NOTE — PROGRESS NOTES
Hospitalist Progress Note      PCP: Kendall Varela MD    Date of Admission: 5/21/2024    Chief Complaint: Left foot ulcer    Hospital Course: 64-year-old male with past medical history of type 2 diabetes, essential hypertension, osteoarthritis who presents to the ED for worsening left foot ulcer.  Patient stated he had his foot on the corner of the bed and have worsening swelling and pain with drainage since.  He denies any fevers or chills at home.  While in ED he had a mildly elevated lactic acid along with an elevated CRP and ESR.  Left foot x-ray showed no signs of osteomyelitis.  He was afebrile with no leukocytosis.  He was started empirically on Vanco and cefepime and wound cultures were obtained.  Podiatry is consulted, did a bedside debridement and MRI left foot is ordered. Wound culture thus far with growth too young to ID.    Subjective: Patient seen sitting in chair, states he is doing well. States his foot looks much better today. No pain. No fevers or chills. Still awaiting podiatry eval. Patient was agreeable for diabetes educator.     Assessment/Plan:  Diabetic foot infection  -Concern for diabetic foot infection on admission.  Area now does not appear infected  -CRP and ESR elevated, afebrile with no leukocytosis.  Pro-Shailesh disorder negative.  Left foot x-ray with no evidence of osteo  -Wound culture thus far with  some growth too young to ID  -Start empiric Vanco/cefepime, continue  -Podiatry eval, did bedside debridement and ordered left foot MRI    Type 2 diabetes mellitus with hyperglycemia  -Update A1c is 12.5.  Diabetes educator consulted  -Home dose is Tresiba 66 units nightly, continue.  Patient states he takes 10 units of lispro with meals but unable to verify on med rec.  Start lispro 5 units with meals  -Continue to trend glucose, uptitrate as necessary  -Hold home glipizide while inpatient    Hypokalemia  -Resolved with replacement.  Monitor daily with as needed  97.9 °F (36.6 °C) (Oral)   Resp 18   Ht 1.829 m (6')   Wt 130.6 kg (287 lb 14.7 oz)   SpO2 98%   BMI 39.05 kg/m²     General appearance: No apparent distress, appears stated age and cooperative.  HEENT: Pupils equal, round, and reactive to light. Conjunctivae/corneas clear.  Neck: Supple, with full range of motion.   Respiratory:  Normal respiratory effort. Clear to auscultation, bilaterally without Rales/Wheezes/Rhonchi.  Cardiovascular: Bradycardic and rhythm with normal S1/S2 without murmurs, rubs or gallops.  Abdomen: Soft, non-tender, non-distended with normal bowel sounds.  Musculoskeletal: Mild edema to the top of left foot, improved from day prior, no edema to right lower extremity  Skin: Tough keratinized skin at base of foot under left great toe without any significant erythema or draining, no pain with palpation. Hard keratinized skin at base on pinky toe on left foot without significant erythema, edema or warmth. No pain with palpation.   Neurologic:  Neurovascularly intact without any focal sensory/motor deficits. Cranial nerves: II-XII intact, grossly non-focal.  Psychiatric: Alert and oriented, thought content appropriate, normal insight  Capillary Refill: Brisk,< 3 seconds   Peripheral Pulses: +2 palpable, equal bilaterally             Labs:   Recent Labs     05/21/24  1407 05/22/24  0605 05/23/24  0813   WBC 10.9 11.0 9.8   HGB 15.2 13.9 14.7   HCT 44.9 41.0 43.4    188 193       Recent Labs     05/22/24  0605 05/22/24  0744 05/23/24  0813   * 137 135*   K 3.3* 3.7 3.7    102 102   CO2 25 25 25   BUN 19 19 18   CREATININE 1.1 1.0 1.1  1.1   CALCIUM 8.5 8.8 8.7       Recent Labs     05/21/24  1407 05/22/24  0605   AST 11* 10*   ALT 11 10   BILITOT 0.4 0.3   ALKPHOS 133* 115       No results for input(s): \"INR\" in the last 72 hours.  No results for input(s): \"CKTOTAL\", \"TROPONINI\" in the last 72 hours.    Urinalysis:      Lab Results   Component Value Date/Time    NITRU

## 2024-05-23 NOTE — PROGRESS NOTES
Clinical Pharmacy Note  Vancomycin Consult    Cristi Ortiz is a 64 y.o. male ordered vancomycin for SSTI --> Diabetic foot wound ; consult received from Dr. Braden to manage therapy. Also receiving Cefepime.    Allergies:  Patient has no known allergies.     Temp max:  Temp (24hrs), Av.9 °F (36.6 °C), Min:97.7 °F (36.5 °C), Max:98.1 °F (36.7 °C)      Recent Labs     24  1407 24  0605 24  0813   WBC 10.9 11.0 9.8         Recent Labs     24  0605 24  0744 24  0813   BUN 19 19 18   CREATININE 1.1 1.0 1.1  1.1           Intake/Output Summary (Last 24 hours) at 2024 1235  Last data filed at 2024 0930  Gross per 24 hour   Intake 910 ml   Output --   Net 910 ml       Culture Results:  Wound culture in process    Ht Readings from Last 1 Encounters:   24 1.829 m (6')        Wt Readings from Last 1 Encounters:   24 130.6 kg (287 lb 14.7 oz)         Estimated Creatinine Clearance: 94 mL/min (based on SCr of 1.1 mg/dL).    Assessment:  Day # 3 of vancomycin.  Vancomycin 1000 mg IV every 12 hours.    Goal -600  Predicted  ( 24,ss)  trough 17.9    Plan:  Rn hung 1 gm dose prior to evaluation of level  Attending note indicates no sign of osteomyelitis  Will reduce to 750 mg q12h  New predicted  ( 24,ss) and trough 13.6  Watch for culture results to assist with further adjustments in treatment.  Level and SCr ordered for       Thank you for the consult.     Jany Keys [facility]

## 2024-05-24 ENCOUNTER — APPOINTMENT (OUTPATIENT)
Dept: MRI IMAGING | Age: 64
End: 2024-05-24
Payer: MEDICARE

## 2024-05-24 VITALS
OXYGEN SATURATION: 95 % | WEIGHT: 289.68 LBS | RESPIRATION RATE: 18 BRPM | SYSTOLIC BLOOD PRESSURE: 158 MMHG | HEART RATE: 59 BPM | BODY MASS INDEX: 39.24 KG/M2 | TEMPERATURE: 98.3 F | DIASTOLIC BLOOD PRESSURE: 80 MMHG | HEIGHT: 72 IN

## 2024-05-24 LAB
BACTERIA SPEC AEROBE CULT: ABNORMAL
BACTERIA SPEC AEROBE CULT: ABNORMAL
CREAT SERPL-MCNC: 1 MG/DL (ref 0.8–1.3)
GFR SERPLBLD CREATININE-BSD FMLA CKD-EPI: 83 ML/MIN/{1.73_M2}
GLUCOSE BLD-MCNC: 153 MG/DL (ref 70–99)
GLUCOSE BLD-MCNC: 160 MG/DL (ref 70–99)
GLUCOSE BLD-MCNC: 189 MG/DL (ref 70–99)
GRAM STN SPEC: ABNORMAL
MRSA DNA SPEC QL NAA+PROBE: NORMAL
ORGANISM: ABNORMAL
ORGANISM: ABNORMAL
PERFORMED ON: ABNORMAL

## 2024-05-24 PROCEDURE — 2580000003 HC RX 258

## 2024-05-24 PROCEDURE — 6370000000 HC RX 637 (ALT 250 FOR IP): Performed by: INTERNAL MEDICINE

## 2024-05-24 PROCEDURE — 2580000003 HC RX 258: Performed by: INTERNAL MEDICINE

## 2024-05-24 PROCEDURE — 82565 ASSAY OF CREATININE: CPT

## 2024-05-24 PROCEDURE — 6360000002 HC RX W HCPCS: Performed by: INTERNAL MEDICINE

## 2024-05-24 PROCEDURE — 6370000000 HC RX 637 (ALT 250 FOR IP)

## 2024-05-24 PROCEDURE — 36415 COLL VENOUS BLD VENIPUNCTURE: CPT

## 2024-05-24 PROCEDURE — 6360000004 HC RX CONTRAST MEDICATION

## 2024-05-24 PROCEDURE — 6360000002 HC RX W HCPCS

## 2024-05-24 PROCEDURE — 94640 AIRWAY INHALATION TREATMENT: CPT

## 2024-05-24 PROCEDURE — 73720 MRI LWR EXTREMITY W/O&W/DYE: CPT

## 2024-05-24 PROCEDURE — 99222 1ST HOSP IP/OBS MODERATE 55: CPT | Performed by: INTERNAL MEDICINE

## 2024-05-24 PROCEDURE — A9577 INJ MULTIHANCE: HCPCS

## 2024-05-24 PROCEDURE — 94760 N-INVAS EAR/PLS OXIMETRY 1: CPT

## 2024-05-24 RX ORDER — SULFAMETHOXAZOLE AND TRIMETHOPRIM 800; 160 MG/1; MG/1
1 TABLET ORAL EVERY 12 HOURS SCHEDULED
Status: DISCONTINUED | OUTPATIENT
Start: 2024-05-24 | End: 2024-05-24 | Stop reason: HOSPADM

## 2024-05-24 RX ORDER — ATENOLOL 100 MG/1
100 TABLET ORAL DAILY
Qty: 30 TABLET | Refills: 3
Start: 2024-05-24

## 2024-05-24 RX ORDER — SULFAMETHOXAZOLE AND TRIMETHOPRIM 800; 160 MG/1; MG/1
1 TABLET ORAL EVERY 12 HOURS SCHEDULED
Qty: 20 TABLET | Refills: 0 | Status: SHIPPED | OUTPATIENT
Start: 2024-05-24 | End: 2024-06-03

## 2024-05-24 RX ORDER — HYDROCHLOROTHIAZIDE 25 MG/1
50 TABLET ORAL DAILY
Qty: 30 TABLET | Refills: 3 | Status: SHIPPED | OUTPATIENT
Start: 2024-05-24

## 2024-05-24 RX ORDER — HYDROCHLOROTHIAZIDE 25 MG/1
50 TABLET ORAL DAILY
Status: DISCONTINUED | OUTPATIENT
Start: 2024-05-25 | End: 2024-05-24 | Stop reason: HOSPADM

## 2024-05-24 RX ORDER — INSULIN LISPRO 100 [IU]/ML
7 INJECTION, SOLUTION INTRAVENOUS; SUBCUTANEOUS
Qty: 3 ML | Refills: 2 | Status: SHIPPED | OUTPATIENT
Start: 2024-05-24

## 2024-05-24 RX ADMIN — GADOBENATE DIMEGLUMINE 20 ML: 529 INJECTION, SOLUTION INTRAVENOUS at 16:01

## 2024-05-24 RX ADMIN — SULFAMETHOXAZOLE AND TRIMETHOPRIM 1 TABLET: 800; 160 TABLET ORAL at 20:03

## 2024-05-24 RX ADMIN — INSULIN LISPRO 5 UNITS: 100 INJECTION, SOLUTION INTRAVENOUS; SUBCUTANEOUS at 09:11

## 2024-05-24 RX ADMIN — INSULIN LISPRO 5 UNITS: 100 INJECTION, SOLUTION INTRAVENOUS; SUBCUTANEOUS at 14:22

## 2024-05-24 RX ADMIN — CEFEPIME 2000 MG: 2 INJECTION, POWDER, FOR SOLUTION INTRAVENOUS at 17:12

## 2024-05-24 RX ADMIN — SODIUM CHLORIDE, PRESERVATIVE FREE 10 ML: 5 INJECTION INTRAVENOUS at 20:03

## 2024-05-24 RX ADMIN — TIOTROPIUM BROMIDE INHALATION SPRAY 2 PUFF: 3.12 SPRAY, METERED RESPIRATORY (INHALATION) at 08:28

## 2024-05-24 RX ADMIN — ATORVASTATIN CALCIUM 10 MG: 10 TABLET, FILM COATED ORAL at 09:01

## 2024-05-24 RX ADMIN — OXYCODONE HYDROCHLORIDE AND ACETAMINOPHEN 1 TABLET: 5; 325 TABLET ORAL at 09:11

## 2024-05-24 RX ADMIN — AMITRIPTYLINE HYDROCHLORIDE 10 MG: 10 TABLET, FILM COATED ORAL at 09:01

## 2024-05-24 RX ADMIN — HYDROCHLOROTHIAZIDE 25 MG: 25 TABLET ORAL at 09:01

## 2024-05-24 RX ADMIN — MOMETASONE FUROATE AND FORMOTEROL FUMARATE DIHYDRATE 2 PUFF: 200; 5 AEROSOL RESPIRATORY (INHALATION) at 08:28

## 2024-05-24 RX ADMIN — FLUTICASONE PROPIONATE 1 SPRAY: 50 SPRAY, METERED NASAL at 09:23

## 2024-05-24 RX ADMIN — CEFEPIME 2000 MG: 2 INJECTION, POWDER, FOR SOLUTION INTRAVENOUS at 04:42

## 2024-05-24 RX ADMIN — MOMETASONE FUROATE AND FORMOTEROL FUMARATE DIHYDRATE 2 PUFF: 200; 5 AEROSOL RESPIRATORY (INHALATION) at 19:23

## 2024-05-24 RX ADMIN — INSULIN LISPRO 5 UNITS: 100 INJECTION, SOLUTION INTRAVENOUS; SUBCUTANEOUS at 17:06

## 2024-05-24 RX ADMIN — ASPIRIN 81 MG: 81 TABLET, COATED ORAL at 09:01

## 2024-05-24 RX ADMIN — PANTOPRAZOLE SODIUM 40 MG: 40 TABLET, DELAYED RELEASE ORAL at 07:35

## 2024-05-24 RX ADMIN — VANCOMYCIN HYDROCHLORIDE 750 MG: 750 INJECTION, POWDER, LYOPHILIZED, FOR SOLUTION INTRAVENOUS at 09:08

## 2024-05-24 ASSESSMENT — PAIN DESCRIPTION - DESCRIPTORS
DESCRIPTORS: ACHING
DESCRIPTORS: DISCOMFORT

## 2024-05-24 ASSESSMENT — PAIN DESCRIPTION - ORIENTATION
ORIENTATION: LEFT
ORIENTATION: LEFT

## 2024-05-24 ASSESSMENT — PAIN SCALES - GENERAL
PAINLEVEL_OUTOF10: 10
PAINLEVEL_OUTOF10: 6

## 2024-05-24 ASSESSMENT — PAIN DESCRIPTION - LOCATION
LOCATION: FOOT
LOCATION: FOOT

## 2024-05-24 NOTE — PLAN OF CARE
Problem: Discharge Planning  Goal: Discharge to home or other facility with appropriate resources  5/24/2024 0922 by Althea Shelton RN  Outcome: Progressing  5/24/2024 0127 by Kris Lopez RN  Outcome: Progressing  Flowsheets (Taken 5/23/2024 2034)  Discharge to home or other facility with appropriate resources:   Identify barriers to discharge with patient and caregiver   Arrange for needed discharge resources and transportation as appropriate   Identify discharge learning needs (meds, wound care, etc)   Refer to discharge planning if patient needs post-hospital services based on physician order or complex needs related to functional status, cognitive ability or social support system     Problem: Pain  Goal: Verbalizes/displays adequate comfort level or baseline comfort level  5/24/2024 0922 by Althea Shelton RN  Outcome: Progressing  5/24/2024 0127 by Kris Lopez RN  Outcome: Progressing     Problem: Skin/Tissue Integrity - Adult  Goal: Skin integrity remains intact  5/24/2024 0127 by Kris Lopez RN  Outcome: Progressing  Goal: Incisions, wounds, or drain sites healing without S/S of infection  5/24/2024 0922 by Althea Shelton RN  Outcome: Progressing  5/24/2024 0127 by Kris Lopez RN  Outcome: Progressing  Flowsheets (Taken 5/23/2024 2034)  Incisions, Wounds, or Drain Sites Healing Without Sign and Symptoms of Infection:   ADMISSION and DAILY: Assess and document risk factors for pressure ulcer development   TWICE DAILY: Assess and document skin integrity   TWICE DAILY: Assess and document dressing/incision, wound bed, drain sites and surrounding tissue   Implement wound care per orders     Problem: Infection - Adult  Goal: Absence of infection at discharge  5/24/2024 0922 by Althea Shelton RN  Outcome: Progressing  5/24/2024 0127 by Kris Lopez RN  Outcome: Progressing  Flowsheets (Taken 5/23/2024 2034)  Absence of infection at discharge:   Assess and

## 2024-05-24 NOTE — PROGRESS NOTES
Discharge medications are ready in inpatient pharmacy for weekend delivery.    05/24/24 3:16 PM

## 2024-05-24 NOTE — CONSULTS
Infectious Diseases Inpatient Consult Note    Reason for Consult:   L DM foot infection, 5th toe osteomyelitis  Requesting Physician:   Dr Bhardwaj  Primary Care Physician:  Kendall Varela MD  History Obtained From:   Terrence, EPIC    Admit Date: 5/21/2024  Hospital Day: 4    CHIEF COMPLAINT:       Chief Complaint   Patient presents with    Foot Pain     Pt presents to the ER with the complaint of left 5th toe pain and draining. Pt appears to have ulcer to the lateral part of the little toe. Pt states he has had this for one month and continues to get worse. Pt is a diabeitc       HISTORY OF PRESENT ILLNESS:      65 yo man  PMH - DM, HTN, obesity (BMI 39), HL, JEANNETTE, OA  PSurgHx - L foot bunionectomy, R TKA    Presents with R foot swelling, R 5th toe wound with drainage  Assoc with pain  No fever / chills.      In ED 5/21, afeb, WBC 10.9.    Admit, started on Vancomycin, Cefepime    Today 5/24, afeb,   No foot pain  Wants to go home       Past Medical History:    Past Medical History:   Diagnosis Date    Asthma     Chronic pain     DDD (degenerative disc disease), lumbar     Diabetes mellitus (HCC)     GERD (gastroesophageal reflux disease)     Hyperlipidemia     Hypertension     Morbid obesity with BMI of 40.0-44.9, adult ([facility]) 5/20/2015    Osteoarthritis     Vitamin D deficiency        Past Surgical History:    Past Surgical History:   Procedure Laterality Date    FOOT SURGERY Left 4-    SHAKA / AKIN BUNIONECTOMY LEFT FOOT, EXCISE NEUROMA LEFT    FOOT SURGERY Left     JOINT REPLACEMENT Right     knee    KNEE ARTHROSCOPY      X 6    NERVE BLOCK Left     2019    TOTAL KNEE ARTHROPLASTY Right 1/6/15       Current Medications:     [START ON 5/25/2024] hydroCHLOROthiazide  50 mg Oral Daily    insulin lispro  5 Units SubCUTAneous TID     aspirin  81 mg Oral Daily    atorvastatin  10 mg Oral Daily    cefepime  2,000 mg IntraVENous Q12H    sodium chloride flush  5-40 mL IntraVENous 2 times per day    enoxaparin  30 mg

## 2024-05-24 NOTE — PROGRESS NOTES
Hospitalist Progress Note      PCP: Kendall Varela MD    Date of Admission: 5/21/2024    Chief Complaint: Left foot ulcer    Hospital Course: 64-year-old male with past medical history of type 2 diabetes, essential hypertension, osteoarthritis who presents to the ED for worsening left foot ulcer.  Patient stated he had his foot on the corner of the bed and have worsening swelling and pain with drainage since.  He denies any fevers or chills at home.  While in ED he had a mildly elevated lactic acid along with an elevated CRP and ESR.  Left foot x-ray showed no signs of osteomyelitis.  He was afebrile with no leukocytosis.  He was started empirically on Vanco and cefepime and wound cultures were obtained.  Podiatry is consulted, did a bedside debridement and MRI left foot is ordered. Wound culture growing beta-hemolytic strep group C staph epidermis.  Patient states he is unable to get MRI of his foot due to claustrophobia even despite medication.  Discussed with podiatry, infectious disease consulted for antibiotic recommendations since osteo cannot be definitively ruled out.    Subjective: Patient seen sitting in chair, states he is feeling fine.  States he cannot get an MRI of his foot due to claustrophobia, even with medications before him.  States he is even tried open MRIs in the past.  Denies any fevers or chills.     Assessment/Plan:  Diabetic foot infection  -Concern for diabetic foot infection on admission.  Area now does not appear infected  -CRP and ESR elevated, afebrile with no leukocytosis.  Pro-Shailesh disorder negative.  Left foot x-ray with no evidence of osteo  -Started on empiric vancomycin and cefepime, MRSA PCR negative, wound culture growing group C strep, continue cefepime for now DC vancomycin.  Infectious disease consulted for recommendations  -Podiatry eval, did bedside debridement and ordered left foot MRI but patient states he is unable to have MRI done    Type 2 diabetes mellitus with

## 2024-05-24 NOTE — PLAN OF CARE
Problem: Discharge Planning  Goal: Discharge to home or other facility with appropriate resources  Outcome: Progressing  Flowsheets (Taken 5/23/2024 2034)  Discharge to home or other facility with appropriate resources:   Identify barriers to discharge with patient and caregiver   Arrange for needed discharge resources and transportation as appropriate   Identify discharge learning needs (meds, wound care, etc)   Refer to discharge planning if patient needs post-hospital services based on physician order or complex needs related to functional status, cognitive ability or social support system       Problem: Pain  Goal: Verbalizes/displays adequate comfort level or baseline comfort level  Outcome: Progressing     Problem: Skin/Tissue Integrity - Adult  Goal: Skin integrity remains intact  Outcome: Progressing  Goal: Incisions, wounds, or drain sites healing without S/S of infection  Outcome: Progressing  Flowsheets (Taken 5/23/2024 2034)  Incisions, Wounds, or Drain Sites Healing Without Sign and Symptoms of Infection:   ADMISSION and DAILY: Assess and document risk factors for pressure ulcer development   TWICE DAILY: Assess and document skin integrity   TWICE DAILY: Assess and document dressing/incision, wound bed, drain sites and surrounding tissue   Implement wound care per orders     Problem: Infection - Adult  Goal: Absence of infection at discharge  Outcome: Progressing  Flowsheets (Taken 5/23/2024 2034)  Absence of infection at discharge:   Assess and monitor for signs and symptoms of infection   Monitor lab/diagnostic results   Monitor all insertion sites i.e., indwelling lines, tubes and drains   Administer medications as ordered   Instruct and encourage patient and family to use good hand hygiene technique   Identify and instruct in appropriate isolation precautions for identified infection/condition       Problem: Metabolic/Fluid and Electrolytes - Adult  Goal: Electrolytes maintained within normal

## 2024-05-24 NOTE — PLAN OF CARE
Patient was off the floor at this time for MRI.  Will plan to see the patient tomorrow during rounds.    Dr. Reji Clark will see the patient tomorrow please call with any questions or concerns.    Mely Calvin DPM   Podiatric Resident PGY2    5/24/2024, 4:32 PM

## 2024-05-24 NOTE — DISCHARGE SUMMARY
Hospital Medicine Discharge Summary    Patient ID: Cristi Ortiz      Patient's PCP: Kendall Varela MD    Admit Date: 5/21/2024     Discharge Date:   5/24/24    Admitting Physician: Meño Braden MD     Discharge Physician: Cheyanne Stover PA-C     Discharge Diagnoses  Diabetic foot infection  Left pinky toe osteomyelitis  Type 2 diabetes with hyperglycemia  Hypokalemia  Hyperlipidemia  Essential hypertension  Bradycardia      Hospital Course: 64-year-old male with past medical history of type 2 diabetes, essential hypertension, osteoarthritis who presents to the ED for worsening left foot ulcer.  Patient stated he had his foot on the corner of the bed and have worsening swelling and pain with drainage since.  He denies any fevers or chills at home.  While in ED he had a mildly elevated lactic acid along with an elevated CRP and ESR.  Left foot x-ray showed no signs of osteomyelitis.  He was afebrile with no leukocytosis.  He was started empirically on Vanco and cefepime and wound cultures were obtained.  Podiatry is consulted, did a bedside debridement and MRI left foot is ordered. Wound culture growing beta-hemolytic strep group C staph epidermis.  Patient states he is unable to get MRI of his foot due to claustrophobia even despite medication.  Discussed with podiatry, infectious disease consulted for antibiotic recommendations.  Patient was finally agreeable to get MRI, left foot MRI showed osteo of fifth toe.  Infectious disease recommending double strength Bactrim for 10 days.  He is to follow-up with podiatrist Dr. Clark outpatient.  Information placed in patient's discharge paperwork.    Diabetic foot infection  Left pinky toe osteomyelitis  -CRP and ESR elevated, afebrile with no leukocytosis.  Pro-Shailesh disorder negative.  Left foot x-ray with no evidence of osteo  -Started on empiric vancomycin and cefepime, MRSA PCR negative, wound culture growing group C strep, continue cefepime for now DC  100-62.5-25 MCG/ACT AEPB inhaler Inhale 1 puff into the lungs daily      oxyCODONE-acetaminophen (PERCOCET) 5-325 MG per tablet Take 1 tablet by mouth 3 times daily as needed for Pain.      ibuprofen (ADVIL;MOTRIN) 800 MG tablet Take 1 tablet by mouth every 8 hours as needed for Pain  Qty: 30 tablet, Refills: 0      glipiZIDE (GLUCOTROL) 5 MG tablet Take 1 tablet by mouth daily      atorvastatin (LIPITOR) 10 MG tablet Take 1 tablet by mouth daily      Insulin Glargine (TOUJEO SOLOSTAR SC) Inject 66 Int'l Units into the skin every morning      albuterol (VENTOLIN HFA) 108 (90 BASE) MCG/ACT inhaler Inhale 2 puffs into the lungs every 6 hours as needed for Wheezing  Qty: 1 Inhaler, Refills: 2    Associated Diagnoses: Asthma      aspirin 81 MG EC tablet Take 1 tablet by mouth daily.  Qty: 30 tablet, Refills: 5    Associated Diagnoses: HTN (hypertension)             Time Spent on discharge is more than 40 min in the examination, evaluation, counseling and review of medications and discharge plan.      Signed:    Cheyanne Stover PA-C   5/24/2024    The patient was seen and examined on day of discharge and this discharge summary is in conjunction with any daily progress note from day of discharge.    Thank you Kendall Varela MD for the opportunity to be involved in this patient's care. If you have any questions or concerns please feel free to contact me at [phone].    NOTE:  This report was transcribed using voice recognition software.  Every effort was made to ensure accuracy; however, inadvertent computerized transcription errors may be present.

## 2024-05-25 NOTE — PROGRESS NOTES
Pt Dc'd and drove home. IV removed. Pt belongings collected. Reviewed AVS with pt. All questions answered. Home meds given to pt from pharmacy.

## 2024-06-10 ENCOUNTER — TELEPHONE (OUTPATIENT)
Dept: PHARMACY | Age: 64
End: 2024-06-10

## 2024-06-10 NOTE — TELEPHONE ENCOUNTER
[facility] for [facility]. Tried to call and reschedule, no answer and no voicemail box available.     Rosi Thorne, PharmD, BCPS  6/10/2024 9:12 AM

## 2025-01-08 ENCOUNTER — TELEPHONE (OUTPATIENT)
Dept: PHARMACY | Age: 65
End: 2025-01-08

## 2025-01-08 NOTE — TELEPHONE ENCOUNTER
Cristi was previously referred to our outpatient center for diabetes management.  He missed an appointment and then we could not reach him. I was able to speak with Cristi today.  He declines a visit. Reports he is doing \"pretty good.\" Right now and following with his PCP Dr. Kendall Varela.     I will close his referral.     Providers can please send a new referral (order [id number]) or call [phone] if needed going forward.     Chrissy Henderson, PharmD  [facility]   [facility]  Diabetes Service  [phone]    For Pharmacy Admin Tracking Only    Program: Medication Management  CPA in place:  Yes  Recommendation Provided To:   Intervention Detail:   Intervention Accepted By:   Gap Closed?:    Time Spent (min): 5